# Patient Record
Sex: FEMALE | Race: WHITE | Employment: FULL TIME | URBAN - METROPOLITAN AREA
[De-identification: names, ages, dates, MRNs, and addresses within clinical notes are randomized per-mention and may not be internally consistent; named-entity substitution may affect disease eponyms.]

---

## 2017-11-28 ENCOUNTER — TRANSFERRED RECORDS (OUTPATIENT)
Dept: HEALTH INFORMATION MANAGEMENT | Facility: CLINIC | Age: 27
End: 2017-11-28

## 2017-12-24 ENCOUNTER — HEALTH MAINTENANCE LETTER (OUTPATIENT)
Age: 27
End: 2017-12-24

## 2018-10-10 ENCOUNTER — TRANSFERRED RECORDS (OUTPATIENT)
Dept: HEALTH INFORMATION MANAGEMENT | Facility: CLINIC | Age: 28
End: 2018-10-10

## 2019-01-09 ENCOUNTER — TRANSFERRED RECORDS (OUTPATIENT)
Dept: HEALTH INFORMATION MANAGEMENT | Facility: CLINIC | Age: 29
End: 2019-01-09

## 2019-01-09 ENCOUNTER — MEDICAL CORRESPONDENCE (OUTPATIENT)
Dept: HEALTH INFORMATION MANAGEMENT | Facility: CLINIC | Age: 29
End: 2019-01-09

## 2019-01-24 ENCOUNTER — TRANSFERRED RECORDS (OUTPATIENT)
Dept: HEALTH INFORMATION MANAGEMENT | Facility: CLINIC | Age: 29
End: 2019-01-24

## 2019-01-25 ENCOUNTER — TRANSFERRED RECORDS (OUTPATIENT)
Dept: HEALTH INFORMATION MANAGEMENT | Facility: CLINIC | Age: 29
End: 2019-01-25

## 2019-02-01 ASSESSMENT — ENCOUNTER SYMPTOMS
STIFFNESS: 0
SEIZURES: 0
DISTURBANCES IN COORDINATION: 0
DECREASED CONCENTRATION: 1
JOINT SWELLING: 0
SPEECH CHANGE: 1
DIZZINESS: 0
MYALGIAS: 1
BACK PAIN: 1
WEAKNESS: 0
NECK PAIN: 0
PANIC: 0
DEPRESSION: 1
ARTHRALGIAS: 1
HEADACHES: 1
INSOMNIA: 1
NERVOUS/ANXIOUS: 1
MUSCLE CRAMPS: 1
MUSCLE WEAKNESS: 0
TINGLING: 1
PARALYSIS: 0
MEMORY LOSS: 1
LOSS OF CONSCIOUSNESS: 0
NUMBNESS: 0
TREMORS: 0

## 2019-02-08 ENCOUNTER — OFFICE VISIT (OUTPATIENT)
Dept: NEUROSURGERY | Facility: CLINIC | Age: 29
End: 2019-02-08
Payer: COMMERCIAL

## 2019-02-08 VITALS
HEART RATE: 89 BPM | RESPIRATION RATE: 20 BRPM | BODY MASS INDEX: 39.22 KG/M2 | DIASTOLIC BLOOD PRESSURE: 74 MMHG | TEMPERATURE: 98.1 F | OXYGEN SATURATION: 100 % | WEIGHT: 221.4 LBS | SYSTOLIC BLOOD PRESSURE: 137 MMHG

## 2019-02-08 DIAGNOSIS — Q06.2 DIASTEMATOMYELIA (H): ICD-10-CM

## 2019-02-08 DIAGNOSIS — Q06.8 TETHERED CORD (H): Primary | ICD-10-CM

## 2019-02-08 RX ORDER — ERGOCALCIFEROL (VITAMIN D2) 50 MCG
2000 CAPSULE ORAL AT BEDTIME
COMMUNITY

## 2019-02-08 RX ORDER — HYDROCODONE BITARTRATE AND ACETAMINOPHEN 5; 325 MG/1; MG/1
1 TABLET ORAL PRN
COMMUNITY
Start: 2019-01-09

## 2019-02-08 RX ORDER — DULOXETIN HYDROCHLORIDE 60 MG/1
CAPSULE, DELAYED RELEASE ORAL
COMMUNITY
Start: 2017-03-06

## 2019-02-08 RX ORDER — HYDROXYZINE HYDROCHLORIDE 25 MG/1
12.5-25 TABLET, FILM COATED ORAL PRN
COMMUNITY
Start: 2019-02-05

## 2019-02-08 RX ORDER — LIDOCAINE 50 MG/G
1 PATCH TOPICAL PRN
COMMUNITY
Start: 2017-11-27

## 2019-02-08 ASSESSMENT — PATIENT HEALTH QUESTIONNAIRE - PHQ9
SUM OF ALL RESPONSES TO PHQ QUESTIONS 1-9: 12
10. IF YOU CHECKED OFF ANY PROBLEMS, HOW DIFFICULT HAVE THESE PROBLEMS MADE IT FOR YOU TO DO YOUR WORK, TAKE CARE OF THINGS AT HOME, OR GET ALONG WITH OTHER PEOPLE: SOMEWHAT DIFFICULT
SUM OF ALL RESPONSES TO PHQ QUESTIONS 1-9: 12

## 2019-02-08 ASSESSMENT — PAIN SCALES - GENERAL: PAINLEVEL: SEVERE PAIN (7)

## 2019-02-08 NOTE — LETTER
2/8/2019       RE: Heaven Marx  [address] [facility] [address]     Dear Colleague,    Thank you for referring your patient, Heaven Marx, to the [facility]. Please see a copy of my visit note below.    Service Date: 02/08/2019      It was a pleasure seeing Heaven in clinic today.  She is a 28-year-old patient with a history of split cord malformation who is here in followup with development of symptom recurrence.  She was last seen by Dr. Awad in 08/2015.  In brief summary, she initially presented to him with intermittent left leg weakness and some problems with bowel and bladder control that had been going on for years.  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  She underwent L2-L3 laminectomy and repair of the diastematomyelia with spinal cord untethering in 04/2015 by Dr. Awad with improvement.  She has been doing well; however, for the last 2 years she has had a recurrence of her initial symptoms.  She has developed increased left lower extremity weakness, mostly involving the back and buttocks.  There is also pain radiating from her back down her buttocks and sometimes down her leg.  She continues to have some bowel and bladder problems, she has constipation which sometimes alternates with diarrhea.  She has been followed by Urology and is on a clean intermittent catheterization schedule; however, she forgets to self-catheterization sometimes.  She has been told she has a neurogenic bladder and she has had urodynamic studies in the past supporting this.  She feels her pain is worse with activities.      PHYSICAL EXAMINATION:  On exam, she is well-appearing.  She is fully oriented.  Speech is fluent and clear.  Extraocular movements are full.  Gaze is conjugate.  Strength in the upper extremities is 5/5 and lower extremity strength is also 5/5 bilaterally.  She is able to change positions  from sitting to standing without difficulty.  She has a mildly ataxic gait.  Reflexes are 2+ throughout with the exception of the right lower extremity which is 1+.  Sensation is grossly intact.  Her incision is well-healed.      I reviewed her most recent MRI scan and compared this to prior scans.  Her most recent MRI scan of her spine was done on 2019.  This looks very similar to her prior scans, not surprisingly.  She has a split cord malformation in a single dural sac at the level of L2-3.  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  There is a small disk bulge at L2-3, which is not causing any significant stenosis.  There is no significant foraminal stenosis.      IMPRESSION:  Split cord malformation with recurrent symptoms.      PLAN:  We discussed that there is a high probability that her recurrence of symptoms are related to retethering.  We discussed that it is also possible that there is something else going on.  Of note, she has had SI joint injections which did not help.  In fact, she felt her symptoms were worse following that.  We discussed options which would include further nonoperative therapy at this point which could include physical therapy and medications.  The other option would be to proceed with surgical exploration and, if adhesions are found, adhesiolysis.  She feels she has exhausted her nonoperative therapy options and this has been going on for years.  She is interested in repeat surgery.  We will plan this and see her back soon.  We discussed risks of the procedure which would include cerebrospinal fluid leak, bleeding, failure to improve symptoms and even possible spinal cord injury.  She understands and wishes to proceed.      RICHA GHOTRA MD       D: 02/10/2019   T: 2019   MT: LALY      Name:     KYLIE MENG   MRN:      [id number]        Account:      [id number]   :      1990           Service Date: 2019      Document: [id number]

## 2019-02-09 ASSESSMENT — PATIENT HEALTH QUESTIONNAIRE - PHQ9: SUM OF ALL RESPONSES TO PHQ QUESTIONS 1-9: 12

## 2019-02-11 NOTE — PROGRESS NOTES
Service Date: 02/08/2019      It was a pleasure seeing Heaven in clinic today.  She is a 28-year-old patient with a history of split cord malformation who is here in followup with development of symptom recurrence.  She was last seen by Dr. Awad in 08/2015.  In brief summary, she initially presented to him with intermittent left leg weakness and some problems with bowel and bladder control that had been going on for years.  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  She underwent L2-L3 laminectomy and repair of the diastematomyelia with spinal cord untethering in 04/2015 by Dr. Awad with improvement.  She has been doing well; however, for the last 2 years she has had a recurrence of her initial symptoms.  She has developed increased left lower extremity weakness, mostly involving the back and buttocks.  There is also pain radiating from her back down her buttocks and sometimes down her leg.  She continues to have some bowel and bladder problems, she has constipation which sometimes alternates with diarrhea.  She has been followed by Urology and is on a clean intermittent catheterization schedule; however, she forgets to self-catheterization sometimes.  She has been told she has a neurogenic bladder and she has had urodynamic studies in the past supporting this.  She feels her pain is worse with activities.      PHYSICAL EXAMINATION:  On exam, she is well-appearing.  She is fully oriented.  Speech is fluent and clear.  Extraocular movements are full.  Gaze is conjugate.  Strength in the upper extremities is 5/5 and lower extremity strength is also 5/5 bilaterally.  She is able to change positions from sitting to standing without difficulty.  She has a mildly ataxic gait.  Reflexes are 2+ throughout with the exception of the right lower extremity which is 1+.  Sensation is grossly intact.  Her incision is well-healed.      I reviewed her most recent MRI scan and  compared this to prior scans.  Her most recent MRI scan of her spine was done on 2019.  This looks very similar to her prior scans, not surprisingly.  She has a split cord malformation in a single dural sac at the level of L2-3.  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  There is a small disk bulge at L2-3, which is not causing any significant stenosis.  There is no significant foraminal stenosis.      IMPRESSION:  Split cord malformation with recurrent symptoms.      PLAN:  We discussed that there is a high probability that her recurrence of symptoms are related to retethering.  We discussed that it is also possible that there is something else going on.  Of note, she has had SI joint injections which did not help.  In fact, she felt her symptoms were worse following that.  We discussed options which would include further nonoperative therapy at this point which could include physical therapy and medications.  The other option would be to proceed with surgical exploration and, if adhesions are found, adhesiolysis.  She feels she has exhausted her nonoperative therapy options and this has been going on for years.  She is interested in repeat surgery.  We will plan this and see her back soon.  We discussed risks of the procedure which would include cerebrospinal fluid leak, bleeding, failure to improve symptoms and even possible spinal cord injury.  She understands and wishes to proceed.         RICHA GHOTRA MD             D: 02/10/2019   T: 2019   MT: LALY      Name:     KYLIE MENG   MRN:      [id number]        Account:      [id number]   :      1990           Service Date: 2019      Document: [id number]

## 2019-03-26 ENCOUNTER — MYC MEDICAL ADVICE (OUTPATIENT)
Dept: NEUROSURGERY | Facility: CLINIC | Age: 29
End: 2019-03-26

## 2019-03-26 NOTE — TELEPHONE ENCOUNTER
From: Heaven Marx  To: Salomón Renner MD  Sent: 3/26/2019 2:42 PM CDT  Subject: Question about an upcoming visit    Hi I am just wondering if there is any idea of how long I will be in hospital for my upcoming surgery? My  will have to take a day off [place] so we were just trying to plan so he can use vacation.    Thanks   Heaven

## 2019-04-23 ENCOUNTER — PRE VISIT (OUTPATIENT)
Dept: SURGERY | Facility: CLINIC | Age: 29
End: 2019-04-23

## 2019-04-23 NOTE — TELEPHONE ENCOUNTER
FUTURE VISIT INFORMATION      SURGERY INFORMATION:    Date: 5/3/19    Location: [facility] OR    Surgeon:  Salomón Renner    Anesthesia Type:  General    RECORDS REQUESTED FROM:       [facility]    Most recent EKG+ Tracin14- [facility]    Most recent ECHO: 14- [facility]

## 2019-05-02 ENCOUNTER — ANESTHESIA EVENT (OUTPATIENT)
Dept: SURGERY | Facility: CLINIC | Age: 29
End: 2019-05-02
Payer: COMMERCIAL

## 2019-05-02 ENCOUNTER — OFFICE VISIT (OUTPATIENT)
Dept: SURGERY | Facility: CLINIC | Age: 29
End: 2019-05-02
Payer: COMMERCIAL

## 2019-05-02 VITALS
BODY MASS INDEX: 38.25 KG/M2 | SYSTOLIC BLOOD PRESSURE: 139 MMHG | HEIGHT: 63 IN | RESPIRATION RATE: 16 BRPM | OXYGEN SATURATION: 98 % | DIASTOLIC BLOOD PRESSURE: 85 MMHG | TEMPERATURE: 98 F | HEART RATE: 98 BPM | WEIGHT: 215.9 LBS

## 2019-05-02 DIAGNOSIS — Z01.818 PREOP EXAMINATION: Primary | ICD-10-CM

## 2019-05-02 DIAGNOSIS — Q06.8 TETHERED CORD (H): ICD-10-CM

## 2019-05-02 DIAGNOSIS — Z01.818 PREOP EXAMINATION: ICD-10-CM

## 2019-05-02 LAB
ALBUMIN UR-MCNC: NEGATIVE MG/DL
AMORPH CRY #/AREA URNS HPF: ABNORMAL /HPF
ANION GAP SERPL CALCULATED.3IONS-SCNC: 8 MMOL/L (ref 3–14)
APPEARANCE UR: ABNORMAL
APTT PPP: 27 SEC (ref 22–37)
BACTERIA #/AREA URNS HPF: ABNORMAL /HPF
BILIRUB UR QL STRIP: NEGATIVE
BUN SERPL-MCNC: 8 MG/DL (ref 7–30)
CALCIUM SERPL-MCNC: 9.1 MG/DL (ref 8.5–10.1)
CHLORIDE SERPL-SCNC: 105 MMOL/L (ref 94–109)
CO2 SERPL-SCNC: 28 MMOL/L (ref 20–32)
COLOR UR AUTO: YELLOW
CREAT SERPL-MCNC: 0.71 MG/DL (ref 0.52–1.04)
ERYTHROCYTE [DISTWIDTH] IN BLOOD BY AUTOMATED COUNT: 13.6 % (ref 10–15)
GFR SERPL CREATININE-BSD FRML MDRD: >90 ML/MIN/{1.73_M2}
GLUCOSE SERPL-MCNC: 79 MG/DL (ref 70–99)
GLUCOSE UR STRIP-MCNC: NEGATIVE MG/DL
HCT VFR BLD AUTO: 37.2 % (ref 35–47)
HGB BLD-MCNC: 12.5 G/DL (ref 11.7–15.7)
HGB UR QL STRIP: ABNORMAL
INR PPP: 1.05 (ref 0.86–1.14)
KETONES UR STRIP-MCNC: NEGATIVE MG/DL
LEUKOCYTE ESTERASE UR QL STRIP: NEGATIVE
MCH RBC QN AUTO: 30.6 PG (ref 26.5–33)
MCHC RBC AUTO-ENTMCNC: 33.6 G/DL (ref 31.5–36.5)
MCV RBC AUTO: 91 FL (ref 78–100)
MUCOUS THREADS #/AREA URNS LPF: PRESENT /LPF
NITRATE UR QL: NEGATIVE
PH UR STRIP: 6 PH (ref 5–7)
PLATELET # BLD AUTO: 274 10E9/L (ref 150–450)
POTASSIUM SERPL-SCNC: 3.3 MMOL/L (ref 3.4–5.3)
RBC # BLD AUTO: 4.09 10E12/L (ref 3.8–5.2)
RBC #/AREA URNS AUTO: 5 /HPF (ref 0–2)
SODIUM SERPL-SCNC: 140 MMOL/L (ref 133–144)
SOURCE: ABNORMAL
SP GR UR STRIP: 1.02 (ref 1–1.03)
SQUAMOUS #/AREA URNS AUTO: 14 /HPF (ref 0–1)
UROBILINOGEN UR STRIP-MCNC: 2 MG/DL (ref 0–2)
WBC # BLD AUTO: 8.2 10E9/L (ref 4–11)
WBC #/AREA URNS AUTO: 5 /HPF (ref 0–5)

## 2019-05-02 RX ORDER — ACETAMINOPHEN 500 MG
1000 TABLET ORAL PRN
Status: ON HOLD | COMMUNITY
End: 2019-05-06

## 2019-05-02 ASSESSMENT — LIFESTYLE VARIABLES: TOBACCO_USE: 0

## 2019-05-02 ASSESSMENT — MIFFLIN-ST. JEOR: SCORE: 1673.45

## 2019-05-02 NOTE — PATIENT INSTRUCTIONS
Preparing for Your Surgery      Name:  Heaven Marx   MRN:  [id number]   :  1990   Today's Date:  2019     Arriving for surgery:  Surgery date:  5/3/19  Arrival time:  5:45AM  Please come to:       [facility] [address]  [address] [place]  [address]    - ? parking is available in front of the hospital      -    Please proceed to [address] on the 3rd floor. [phone]?     - ?If you are in need of directions, wheelchair or escort please stop at the Information Desk in the lobby.  Inform the information person that you are here for surgery; a wheelchair and escort to [address] will be provided.?     What can I eat or drink?  -  You may have solid food or milk products until 8 hours prior to your surgery. 19, 11:45PM  -  You may have water, apple juice or 7up/Sprite until 2 hours prior to your surgery. 5/3/19, 5:45AM    Which medicines can I take?  Stop Aspirin, vitamins and supplements one week prior to surgery.  Hold Ibuprofen for 24 hours and/or Naproxen for 48 hours prior to surgery.   -  Do NOT take these medications in the morning, the day of surgery:    Vitamin D    -  Please take these medications the day of surgery:    Bupropion(Wellbutrin)  Duloxetine(Cymbalta)      -As needed:   Hydrocodone-Acetaminophen(Norco)   Hydroxyzine(Atarax)   Lidocaine(Lidoderm) patch    How do I prepare myself?  -  Take two showers: one the night before surgery; and one the morning of surgery.         Use Scrubcare or Hibiclens to wash from neck down.  You may use your own shampoo and conditioner. No other hair products.   -  Do NOT use lotion, powder, deodorant, or antiperspirant the day of your surgery.  -  Do NOT wear any makeup, fingernail polish or jewelry.  - Do not bring your own medications to the hospital, except for inhalers and eye drops.  -  Bring your ID and insurance card.    Questions or Concerns:  -If you have questions or concerns regarding the day  of surgery, please call [phone].     -For questions after surgery please call your surgeons office.           AFTER YOUR SURGERY  Breathing exercises   Breathing exercises help you recover faster. Take deep breaths and let the air out slowly. This will:     Help you wake up after surgery.    Help prevent complications like pneumonia.  Preventing complications will help you go home sooner.   We may give you a breathing device (incentive spirometer) to encourage you to breathe deeply.   Nausea and vomiting   You may feel sick to your stomach after surgery; if so, let your nurse know.    Pain control:  After surgery, you may have pain. Our goal is to help you manage your pain. Pain medicine will help you feel comfortable enough to do activities that will help you heal.  These activities may include breathing exercises, walking and physical therapy.   To help your health care team treat your pain we will ask: 1) If you have pain  2) where it is located 3) describe your pain in your words  Methods of pain control include medications given by mouth, vein or by nerve block for some surgeries.  Sequential Compression Device (SCD) or Pneumo Boots:  You may need to wear SCD S on your legs or feet. These are wraps connected to a machine that pumps in air and releases it. The repeated pumping helps prevent blood clots from forming.

## 2019-05-02 NOTE — ANESTHESIA PREPROCEDURE EVALUATION
Anesthesia Pre-Procedure Evaluation    Patient: Heaven Marx   MRN:     [id number] Gender:   female   Age:    28 year old :      1990        Preoperative Diagnosis: Tethered Cord   Procedure(s):  Spinal Cord Untethering, Adhesion Lysis, Filum Lysis     Past Medical History:   Diagnosis Date     Diastematomyelia (H)      Injury to nerves     In her foot, she dropped air conditioner on it     Other depressive disorder     No Comments Provided      Past Surgical History:   Procedure Laterality Date     GYN SURGERY      hysterectomy BSO     RELEASE TETHERED CORD N/A 2015    Procedure: RELEASE TETHERED CORD;  Surgeon: Nabil Awad MD;  Location: [facility] OR          Anesthesia Evaluation     . Pt has had prior anesthetic. Type: General    No history of anesthetic complications          ROS/MED HX    ENT/Pulmonary: Comment: History of TMJ prior to getting dentures    (+)ENE risk factors snores loudly, obese, , . .   (-) tobacco use   Neurologic:     (+)other neuro Tethered cord, RLS, LLE radiculopathy    Cardiovascular:  - neg cardiovascular ROS   (+) ----. : . . . :. . Previous cardiac testing Echodate:results:Interpretation Summary  Technically difficult study.  The left ventricular systolic function is normal.  Doppler findings do not suggest pulmonary hypertension.  No significant valvular disease present.  Compared to prior echo report of 2012, there is no significant change.    Left Ventricle  The left ventricular systolic function is normal. The left ventricle is normal size.  There is normal left ventricular wall thickness. Wall motion is normal.date: results:ECG reviewed date: results:Sinus bradycardia  T wave abnormality, consider anterolateral ischemia  Prolonged QT  Abnormal ECG  When compared with ECG of 10-MAY-2014 23:59,  Vent. rate has decreased BY  46 BPM  Nonspecific T wave abnormality no longer evident in Inferior leads  T wave inversion now evident in Lateral leads date:  "results:          METS/Exercise Tolerance:  >4 METS   Hematologic:  - neg hematologic  ROS       Musculoskeletal:   (+)  other musculoskeletal- left low back pain      GI/Hepatic:     (+) GERD Other,       Renal/Genitourinary:     (+) Other Renal/ Genitourinary, neurogenic bladder - intermittent self cath      Endo:     (+) Obesity, .      Psychiatric:     (+) psychiatric history anxiety and depression      Infectious Disease:  - neg infectious disease ROS       Malignancy:      - no malignancy   Other:    (+) No chance of pregnancy H/O Chronic Pain,H/O chronic opiod use ,   - neg other ROS                     PHYSICAL EXAM:   Mental Status/Neuro: A/A/O   Airway: Facies: Feasible  Mallampati: I  Mouth/Opening: Full  TM distance: > 6 cm  Neck ROM: Full   Respiratory: Auscultation: CTAB     Resp. Rate: Normal     Resp. Effort: Normal      CV: Rhythm: Regular  Rate: Age appropriate  Heart: Normal Sounds   Comments:      Dental:  Dentures: Lower; Upper     Habitus: Obesity             Lab Results   Component Value Date    WBC 7.9 04/10/2015    HGB 10.6 (L) 04/10/2015    HCT 32.5 (L) 04/10/2015     04/10/2015     04/10/2015    POTASSIUM 3.7 04/10/2015    CHLORIDE 110 (H) 04/10/2015    CO2 24 04/10/2015    BUN 7 04/10/2015    CR 0.73 04/10/2015    GLC 84 04/10/2015    JENNIFER 8.0 (L) 04/10/2015    PTT 28 04/08/2015    INR 1.02 04/08/2015    HCG Negative 02/17/2013       Preop Vitals  BP Readings from Last 3 Encounters:   02/08/19 137/74   08/12/15 112/69   05/26/15 117/68    Pulse Readings from Last 3 Encounters:   02/08/19 89   08/12/15 80   05/26/15 84      Resp Readings from Last 3 Encounters:   02/08/19 20   04/13/15 14   04/08/15 16    SpO2 Readings from Last 3 Encounters:   02/08/19 100%   04/13/15 93%   04/08/15 100%      Temp Readings from Last 1 Encounters:   02/08/19 36.7  C (98.1  F) (Oral)    Ht Readings from Last 1 Encounters:   08/12/15 1.6 m (5' 3\")      Wt Readings from Last 1 Encounters: " "  02/08/19 100.4 kg (221 lb 6.4 oz)    Estimated body mass index is 39.22 kg/m  as calculated from the following:    Height as of 8/12/15: 1.6 m (5' 3\").    Weight as of 2/8/19: 100.4 kg (221 lb 6.4 oz).     LDA:  Peripheral IV 04/09/15 Left Lower forearm (Active)   Number of days: 1484       Peripheral IV 04/12/15 Left;Anterior;Proximal Upper forearm (Active)   Number of days: 1481            Assessment:   ASA SCORE: 2       Documentation: H&P complete; Preop Testing complete; Consents complete   Proceeding: Proceed without further delay  Tobacco Use:  NO Active use of Tobacco/UNKNOWN Tobacco use status     Plan:   Anes. Type:  General   Pre-Induction: Midazolam IV; Acetaminophen PO   Induction:  IV (Standard)   Airway: Oral ETT   Access/Monitoring: PIV; 2nd PIV   Maintenance: Balanced   Emergence: Procedure Site   Logistics: Observation/Admission     Postop Pain/Sedation Strategy:  Standard-Options: Opioids PRN     PONV Management:  Adult Risk Factors: Female, Non-Smoker, Postop Opioids  Prevention: Ondansetron; Dexamethasone                  PAC Discussion and Assessment    ASA Classification: 2  Case is suitable for: [place]  Anesthetic techniques and relevant risks discussed: GA  Invasive monitoring and risk discussed:   Types:   Possibility and Risk of blood transfusion discussed:   NPO instructions given:   Additional anesthetic preparation and risks discussed:   Needs early admission to pre-op area:   Other:     PAC Resident/NP Anesthesia Assessment:  Heaven Marx is a 28 year old female scheduled for a Spinal Cord Untethering, Adhesion Lysis, Filum Lysis on 5/2/2019 by Dr. Renner in treatment of a tethered cord.  PAC referral for risk assessment and optimization for anesthesia with comorbid conditions of: obesity, RLS, GERD, neurogenic bladder, LLE radiculopathy, depression and anxiety.      Pre-operative considerations:  1.  Cardiac:  Functional status- METS >4.  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  She has no known cardiac conditions.  Intermediate risk surgery with 0.4% risk of major adverse cardiac event.   2.  Pulm:  Airway feasible.  ENE risk: low.    3.  GI:  Risk of PONV score = 3.  If > 2, anti-emetic intervention recommended.  She has rare GERD symptoms when she has an empty stomach.  She uses no GERD medications.  Encouraged to notify anesthesia tomorrow if noticing any GERD symptoms in pre-op due to her NPO status.  4. : She self catheterizes intermittently for neurogenic bladder.      VTE risk:  0.26%    Patient is optimized and is acceptable candidate for the proposed procedure.  No further diagnostic evaluation is needed.     **For further details of assessment, testing, and physical exam please see H and P completed on same date.          Nichole Khan DNP, RN, APRN      Reviewed and Signed by PAC Mid-Level Provider/Resident  Mid-Level Provider/Resident: Nichole Khan DNP, RN, APRN  Date: 5/2/2019  Time: 1604    Attending Anesthesiologist Anesthesia Assessment:        Anesthesiologist:   Date:   Time:   Pass/Fail:   Disposition:     PAC Pharmacist Assessment:        Pharmacist:   Date:   Time:        Everette Conte DO

## 2019-05-02 NOTE — RESULT ENCOUNTER NOTE
Vinny Collado,    Your test results are attached.  All of your labs are good for surgery tomorrow.  As we discussed over the phone, I would recommend a repeat urine test with your primary care provider at some point after surgery.          Nichole Khan DNP, RN, ANP-C

## 2019-05-02 NOTE — H&P
Pre-Operative H & P     CC:  Preoperative exam to assess for increased cardiopulmonary risk while undergoing surgery and anesthesia.    Date of Encounter: 5/2/2019  Primary Care Physician:  Lorrie Olivas  Associated diagnosis: tethered cord    HPI  Heaven Marx is a 28 year old female who presents for pre-operative H & P in preparation for a Spinal Cord Untethering, Adhesion Lysis, Filum Lysis on 5/2/2019 by Dr. Renner at [facility].     Heaven Marx is a 28 year old female with obesity, RLS, GERD, neurogenic bladder, LLE radiculopathy, depression and anxiety that has a tethered spinal cord.  She underwent a tethered cord release on 4/9/2015 with Dr. Awad.  She reports that her symptoms resolved after that for about 2 years, but then the symptoms returned.  Her symptoms are primarily left low back pain and LLE radiculopathy.  She also has neurogenic bladder.  She consulted earlier this past winter with Dr. George and now the above listed surgery has been recommended for treatment.       History is obtained from the patient and the medical record.     Past Medical History  Past Medical History:   Diagnosis Date     Anxiety      Depression      Diastematomyelia (H)      Injury to nerves     In her foot, she dropped air conditioner on it     Neurogenic bladder      Obesity      Other depressive disorder     No Comments Provided     RLS (restless legs syndrome)      Tethered cord (H)        Past Surgical History  Past Surgical History:   Procedure Laterality Date     GYN SURGERY  2014    hysterectomy BSO     RELEASE TETHERED CORD N/A 4/9/2015    Procedure: RELEASE TETHERED CORD;  Surgeon: Nabil Awad MD;  Location: [facility] OR       Hx of Blood transfusions/reactions: none    Hx of abnormal bleeding or anti-platelet use: none    Menstrual history: No LMP recorded. Patient has had a hysterectomy.:     Steroid use in the last year: none    Personal or FH with  difficulty with Anesthesia:  none    Prior to Admission Medications  Current Outpatient Medications   Medication Sig Dispense Refill     acetaminophen (TYLENOL) 500 MG tablet Take 1,000 mg by mouth as needed for mild pain       buPROPion (WELLBUTRIN XL) 300 MG 24 hr tablet Take 150 mg by mouth At Bedtime        DULoxetine (CYMBALTA) 60 MG capsule TAKE 1 CAPSULE DAILY AT BEDTIME DO NOT CRUSH       HYDROcodone-acetaminophen (NORCO) 5-325 MG tablet Take 1 tablet by mouth as needed for pain        hydrOXYzine (ATARAX) 25 MG tablet Take 12.5-25 mg by mouth as needed        lidocaine (LIDODERM) 5 % patch Place 1 patch onto the skin as needed        Vitamin D2, Ergocalciferol, 2000 units CAPS Take 2,000 Units by mouth At Bedtime ON HOLD FOR SURGERY SINCE 04/18/2019         Allergies  Allergies   Allergen Reactions     Gabapentin Hives     Sulfa Drugs Hives       Social History  Social History     Socioeconomic History     Marital status:      Spouse name: Not on file     Number of children: 0     Years of education: Not on file     Highest education level: Not on file   Occupational History     Occupation: CNA     Employer: OTHER   Social Needs     Financial resource strain: Not on file     Food insecurity:     Worry: Not on file     Inability: Not on file     Transportation needs:     Medical: Not on file     Non-medical: Not on file   Tobacco Use     Smoking status: Never Smoker     Smokeless tobacco: Never Used   Substance and Sexual Activity     Alcohol use: No     Comment: rare     Drug use: No     Types: Other     Comment: Drug use: No     Sexual activity: Never   Lifestyle     Physical activity:     Days per week: Not on file     Minutes per session: Not on file     Stress: Not on file   Relationships     Social connections:     Talks on phone: Not on file     Gets together: Not on file     Attends Jew service: Not on file     Active member of club or organization: Not on file     Attends meetings of  clubs or organizations: Not on file     Relationship status: Not on file     Intimate partner violence:     Fear of current or ex partner: Not on file     Emotionally abused: Not on file     Physically abused: Not on file     Forced sexual activity: Not on file   Other Topics Concern     Parent/sibling w/ CABG, MI or angioplasty before 65F 55M? Not Asked   Social History Narrative    ** Merged History Encounter **         Preloaded.  1/3/2013       Family History  Family History   Problem Relation Age of Onset     Hypertension Mother      No Known Problems Father      Aneurysm Maternal Grandmother         brain     Kidney Disease Maternal Grandfather      Diabetes Maternal Grandfather      Lung Cancer Paternal Grandmother      Liver Cancer Paternal Grandfather      Neurologic Disorder No family hx of                ROS/MED HX    The complete review of systems is negative other than noted in the HPI or here.   ENT/Pulmonary: Comment: History of TMJ prior to getting dentures    (+)ENE risk factors snores loudly, obese, , . .   (-) tobacco use   Neurologic:     (+)other neuro Tethered cord, RLS, LLE radiculopathy    Cardiovascular:  - neg cardiovascular ROS   (+) ----. : . . . :. . No previous cardiac testing       METS/Exercise Tolerance:  >4 METS   Hematologic:  - neg hematologic  ROS       Musculoskeletal:   (+)  other musculoskeletal- left low back pain      GI/Hepatic:     (+) GERD Other,       Renal/Genitourinary:     (+) Other Renal/ Genitourinary, neurogenic bladder - intermittent self cath      Endo:     (+) Obesity, .      Psychiatric:     (+) psychiatric history anxiety and depression      Infectious Disease:  - neg infectious disease ROS       Malignancy:      - no malignancy   Other:    (+) No chance of pregnancy H/O Chronic Pain,H/O chronic opiod use ,   - neg other ROS                     PHYSICAL EXAM:   Mental Status/Neuro: A/A/O   Airway: Facies: Feasible  Mallampati: I  Mouth/Opening: Full  TM  "distance: > 6 cm  Neck ROM: Full   Respiratory: Auscultation: CTAB     Resp. Rate: Normal     Resp. Effort: Normal      CV: Rhythm: Regular  Rate: Age appropriate  Heart: Normal Sounds   Comments:      Dental:  Dentures: Lower; Upper     Habitus: Obesity             Temp: 98  F (36.7  C) Temp src: Oral BP: 139/85 Pulse: 98   Resp: 16 SpO2: 98 %         215 lbs 14.4 oz  5' 3\"   Body mass index is 38.24 kg/m .       Physical Exam  Constitutional: Awake, alert, cooperative, no apparent distress, and appears stated age. obese  Eyes: Pupils equal, round and reactive to light, extra ocular muscles intact, sclera clear, conjunctiva normal.  HENT: Normocephalic, oral pharynx with moist mucus membranes.  Dentition - upper and lower dentures.  No goiter appreciated.   Respiratory: Clear to auscultation bilaterally, no crackles or wheezing.  Cardiovascular: Regular rate and rhythm, normal S1 and S2, and no murmur noted.  Carotids +2, no bruits. No edema. Palpable pulses to radial  DP and PT arteries.   GI: Normal bowel sounds, soft, non-distended, non-tender, no masses palpated, no hepatosplenomegaly.    Lymph/Hematologic: No cervical lymphadenopathy and no supraclavicular lymphadenopathy.  Genitourinary:  deferred  Skin: Warm and dry.  No rashes at anticipated surgical site.   Musculoskeletal: Full ROM of neck. There is no redness, warmth, or swelling of the exposed joints. Gross motor strength is normal.    Neurologic: Awake, alert, oriented to name, place and time. Cranial nerves II-XII are grossly intact. Gait is normal.   Neuropsychiatric: Calm, cooperative. Normal affect.     Labs: (personally reviewed)  Component      Latest Ref Rng & Units 5/2/2019   Color Urine       Yellow   Appearance Urine       Slightly Cloudy   Glucose Urine      NEG:Negative mg/dL Negative   Bilirubin Urine      NEG:Negative Negative   Ketones Urine      NEG:Negative mg/dL Negative   Specific Gravity Urine      1.003 - 1.035 1.019   Blood " Urine      NEG:Negative Moderate (A)   pH Urine      5.0 - 7.0 pH 6.0   Protein Albumin Urine      NEG:Negative mg/dL Negative   Urobilinogen mg/dL      0.0 - 2.0 mg/dL 2.0   Nitrite Urine      NEG:Negative Negative   Leukocyte Esterase Urine      NEG:Negative Negative   Source       Midstream Urine   RBC Urine      0 - 2 /HPF 5 (H)   WBC Urine      0 - 5 /HPF 5   Bacteria Urine      NEG:Negative /HPF Few (A)   Squamous Epithelial /HPF Urine      0 - 1 /HPF 14 (H)   Mucous Urine      NEG:Negative /LPF Present (A)   Amorphous Crystals      NEG:Negative /HPF Many (A)   Sodium      133 - 144 mmol/L 140   Potassium      3.4 - 5.3 mmol/L 3.3 (L)   Chloride      94 - 109 mmol/L 105   Carbon Dioxide      20 - 32 mmol/L 28   Anion Gap      3 - 14 mmol/L 8   Glucose      70 - 99 mg/dL 79   Urea Nitrogen      7 - 30 mg/dL 8   Creatinine      0.52 - 1.04 mg/dL 0.71   GFR Estimate      >60 mL/min/1.73:m2 >90   GFR Estimate If Black      >60 mL/min/1.73:m2 >90   Calcium      8.5 - 10.1 mg/dL 9.1   WBC      4.0 - 11.0 10e9/L 8.2   RBC Count      3.8 - 5.2 10e12/L 4.09   Hemoglobin      11.7 - 15.7 g/dL 12.5   Hematocrit      35.0 - 47.0 % 37.2   MCV      78 - 100 fl 91   MCH      26.5 - 33.0 pg 30.6   MCHC      31.5 - 36.5 g/dL 33.6   RDW      10.0 - 15.0 % 13.6   Platelet Count      150 - 450 10e9/L 274   INR      0.86 - 1.14 1.05   PTT      22 - 37 sec 27             Echocardiogram  2014  Interpretation Summary  Technically difficult study.  The left ventricular systolic function is normal.  Doppler findings do not suggest pulmonary hypertension.  No significant valvular disease present.  Compared to prior echo report of 8/16/2012, there is no significant change.    Left Ventricle  The left ventricular systolic function is normal. The left ventricle is normal size.  There is normal left ventricular wall thickness. Wall motion is normal.    EKG  2014  Sinus bradycardia  T wave abnormality, consider anterolateral  ischemia  Prolonged QT  Abnormal ECG  When compared with ECG of 10-MAY-2014 23:59,  Vent. rate has decreased BY  46 BPM  Nonspecific T wave abnormality no longer evident in Inferior leads  T wave inversion now evident in Lateral leads        Outside records reviewed from: Care Everywhere    ASSESSMENT and PLAN  Heaven Marx is a 28 year old female scheduled for a Spinal Cord Untethering, Adhesion Lysis, Filum Lysis on 5/2/2019 by Dr. Renner in treatment of a tethered cord.  PAC referral for risk assessment and optimization for anesthesia with comorbid conditions of: obesity, RLS, GERD, neurogenic bladder, LLE radiculopathy, depression and anxiety.      Pre-operative considerations:  1.  Cardiac:  Functional status- METS >4.  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  She has no known cardiac conditions.  Intermediate risk surgery with 0.4% risk of major adverse cardiac event.   2.  Pulm:  Airway feasible.  ENE risk: low.    3.  GI:  Risk of PONV score = 3.  If > 2, anti-emetic intervention recommended.  She has rare GERD symptoms when she has an empty stomach.  She uses no GERD medications.  Encouraged to notify anesthesia tomorrow if noticing any GERD symptoms in pre-op due to her NPO status.  4. : She self catheterizes intermittently for neurogenic bladder.      VTE risk:  0.26%    Patient is optimized and is acceptable candidate for the proposed procedure.  No further diagnostic evaluation is needed.             Nichole Khan DNP, RN, APRN  Preoperative Assessment Center  [facility]  Phone: [phone]  Fax: [phone]

## 2019-05-03 ENCOUNTER — ANESTHESIA (OUTPATIENT)
Dept: SURGERY | Facility: CLINIC | Age: 29
End: 2019-05-03
Payer: COMMERCIAL

## 2019-05-03 ENCOUNTER — HOSPITAL ENCOUNTER (INPATIENT)
Facility: CLINIC | Age: 29
LOS: 3 days | Discharge: HOME | End: 2019-05-06
Attending: NEUROLOGICAL SURGERY | Admitting: NEUROLOGICAL SURGERY
Payer: COMMERCIAL

## 2019-05-03 ENCOUNTER — APPOINTMENT (OUTPATIENT)
Dept: GENERAL RADIOLOGY | Facility: CLINIC | Age: 29
End: 2019-05-03
Attending: ANESTHESIOLOGY
Payer: COMMERCIAL

## 2019-05-03 ENCOUNTER — APPOINTMENT (OUTPATIENT)
Dept: GENERAL RADIOLOGY | Facility: CLINIC | Age: 29
End: 2019-05-03
Attending: NEUROLOGICAL SURGERY
Payer: COMMERCIAL

## 2019-05-03 DIAGNOSIS — Z98.890 S/P SPINAL SURGERY: Primary | ICD-10-CM

## 2019-05-03 LAB
ABO + RH BLD: NORMAL
ABO + RH BLD: NORMAL
BLD GP AB SCN SERPL QL: NORMAL
BLOOD BANK CMNT PATIENT-IMP: NORMAL
GLUCOSE BLDC GLUCOMTR-MCNC: 87 MG/DL (ref 70–99)
SPECIMEN EXP DATE BLD: NORMAL

## 2019-05-03 PROCEDURE — 95940 IONM IN OPERATNG ROOM 15 MIN: CPT | Performed by: NEUROLOGICAL SURGERY

## 2019-05-03 PROCEDURE — 25800030 ZZH RX IP 258 OP 636: Performed by: STUDENT IN AN ORGANIZED HEALTH CARE EDUCATION/TRAINING PROGRAM

## 2019-05-03 PROCEDURE — 40000170 ZZH STATISTIC PRE-PROCEDURE ASSESSMENT II: Performed by: NEUROLOGICAL SURGERY

## 2019-05-03 PROCEDURE — 40000986 XR CHEST PORT 1 VW

## 2019-05-03 PROCEDURE — 37000009 ZZH ANESTHESIA TECHNICAL FEE, EACH ADDTL 15 MIN: Performed by: NEUROLOGICAL SURGERY

## 2019-05-03 PROCEDURE — 12000001 ZZH R&B MED SURG/OB UMMC

## 2019-05-03 PROCEDURE — 00NY0ZZ RELEASE LUMBAR SPINAL CORD, OPEN APPROACH: ICD-10-PCS | Performed by: NEUROLOGICAL SURGERY

## 2019-05-03 PROCEDURE — 25000128 H RX IP 250 OP 636: Performed by: STUDENT IN AN ORGANIZED HEALTH CARE EDUCATION/TRAINING PROGRAM

## 2019-05-03 PROCEDURE — 71000014 ZZH RECOVERY PHASE 1 LEVEL 2 FIRST HR: Performed by: NEUROLOGICAL SURGERY

## 2019-05-03 PROCEDURE — 00000146 ZZHCL STATISTIC GLUCOSE BY METER IP

## 2019-05-03 PROCEDURE — 36000070 ZZH SURGERY LEVEL 5 EA 15 ADDTL MIN - UMMC: Performed by: NEUROLOGICAL SURGERY

## 2019-05-03 PROCEDURE — 25000128 H RX IP 250 OP 636: Performed by: NEUROLOGICAL SURGERY

## 2019-05-03 PROCEDURE — 25000125 ZZHC RX 250: Performed by: STUDENT IN AN ORGANIZED HEALTH CARE EDUCATION/TRAINING PROGRAM

## 2019-05-03 PROCEDURE — 40000277 XR SURGERY CARM FLUORO LESS THAN 5 MIN W STILLS: Mod: TC

## 2019-05-03 PROCEDURE — 27210794 ZZH OR GENERAL SUPPLY STERILE: Performed by: NEUROLOGICAL SURGERY

## 2019-05-03 PROCEDURE — 27210995 ZZH RX 272: Performed by: NEUROLOGICAL SURGERY

## 2019-05-03 PROCEDURE — 25000566 ZZH SEVOFLURANE, EA 15 MIN: Performed by: NEUROLOGICAL SURGERY

## 2019-05-03 PROCEDURE — 25000132 ZZH RX MED GY IP 250 OP 250 PS 637: Performed by: STUDENT IN AN ORGANIZED HEALTH CARE EDUCATION/TRAINING PROGRAM

## 2019-05-03 PROCEDURE — 8E0WXBF COMPUTER ASSISTED PROCEDURE OF TRUNK REGION, WITH FLUOROSCOPY: ICD-10-PCS | Performed by: NEUROLOGICAL SURGERY

## 2019-05-03 PROCEDURE — 37000008 ZZH ANESTHESIA TECHNICAL FEE, 1ST 30 MIN: Performed by: NEUROLOGICAL SURGERY

## 2019-05-03 PROCEDURE — 25000128 H RX IP 250 OP 636: Performed by: NURSE ANESTHETIST, CERTIFIED REGISTERED

## 2019-05-03 PROCEDURE — 71000015 ZZH RECOVERY PHASE 1 LEVEL 2 EA ADDTL HR: Performed by: NEUROLOGICAL SURGERY

## 2019-05-03 PROCEDURE — 25000125 ZZHC RX 250: Performed by: NEUROLOGICAL SURGERY

## 2019-05-03 PROCEDURE — 27211022 ZZHC OR IOM SUPPLIES OPNP: Performed by: NEUROLOGICAL SURGERY

## 2019-05-03 PROCEDURE — 36000068 ZZH SURGERY LEVEL 5 1ST 30 MIN - UMMC: Performed by: NEUROLOGICAL SURGERY

## 2019-05-03 RX ORDER — POLYETHYLENE GLYCOL 3350 17 G/17G
17 POWDER, FOR SOLUTION ORAL 3 TIMES DAILY
Status: DISCONTINUED | OUTPATIENT
Start: 2019-05-03 | End: 2019-05-06 | Stop reason: HOSPADM

## 2019-05-03 RX ORDER — LABETALOL 20 MG/4 ML (5 MG/ML) INTRAVENOUS SYRINGE
10
Status: DISCONTINUED | OUTPATIENT
Start: 2019-05-03 | End: 2019-05-03 | Stop reason: HOSPADM

## 2019-05-03 RX ORDER — SODIUM CHLORIDE, SODIUM GLUCONATE, SODIUM ACETATE, POTASSIUM CHLORIDE AND MAGNESIUM CHLORIDE 526; 502; 368; 37; 30 MG/100ML; MG/100ML; MG/100ML; MG/100ML; MG/100ML
INJECTION, SOLUTION INTRAVENOUS CONTINUOUS PRN
Status: DISCONTINUED | OUTPATIENT
Start: 2019-05-03 | End: 2019-05-03

## 2019-05-03 RX ORDER — LIDOCAINE HYDROCHLORIDE 20 MG/ML
INJECTION, SOLUTION INFILTRATION; PERINEURAL PRN
Status: DISCONTINUED | OUTPATIENT
Start: 2019-05-03 | End: 2019-05-03

## 2019-05-03 RX ORDER — BUPROPION HYDROCHLORIDE 150 MG/1
150 TABLET ORAL AT BEDTIME
Status: DISCONTINUED | OUTPATIENT
Start: 2019-05-03 | End: 2019-05-06 | Stop reason: HOSPADM

## 2019-05-03 RX ORDER — NALOXONE HYDROCHLORIDE 0.4 MG/ML
.1-.4 INJECTION, SOLUTION INTRAMUSCULAR; INTRAVENOUS; SUBCUTANEOUS
Status: DISCONTINUED | OUTPATIENT
Start: 2019-05-03 | End: 2019-05-06 | Stop reason: HOSPADM

## 2019-05-03 RX ORDER — DEXAMETHASONE SODIUM PHOSPHATE 4 MG/ML
INJECTION, SOLUTION INTRA-ARTICULAR; INTRALESIONAL; INTRAMUSCULAR; INTRAVENOUS; SOFT TISSUE PRN
Status: DISCONTINUED | OUTPATIENT
Start: 2019-05-03 | End: 2019-05-03

## 2019-05-03 RX ORDER — KETAMINE HYDROCHLORIDE 10 MG/ML
INJECTION, SOLUTION INTRAMUSCULAR; INTRAVENOUS PRN
Status: DISCONTINUED | OUTPATIENT
Start: 2019-05-03 | End: 2019-05-03

## 2019-05-03 RX ORDER — CEFAZOLIN SODIUM 1 G/3ML
1 INJECTION, POWDER, FOR SOLUTION INTRAMUSCULAR; INTRAVENOUS SEE ADMIN INSTRUCTIONS
Status: DISCONTINUED | OUTPATIENT
Start: 2019-05-03 | End: 2019-05-03 | Stop reason: HOSPADM

## 2019-05-03 RX ORDER — FENTANYL CITRATE 50 UG/ML
25-50 INJECTION, SOLUTION INTRAMUSCULAR; INTRAVENOUS
Status: DISCONTINUED | OUTPATIENT
Start: 2019-05-03 | End: 2019-05-03 | Stop reason: HOSPADM

## 2019-05-03 RX ORDER — ACETAMINOPHEN 325 MG/1
650 TABLET ORAL EVERY 4 HOURS PRN
Status: DISCONTINUED | OUTPATIENT
Start: 2019-05-03 | End: 2019-05-06 | Stop reason: HOSPADM

## 2019-05-03 RX ORDER — SODIUM CHLORIDE, SODIUM LACTATE, POTASSIUM CHLORIDE, CALCIUM CHLORIDE 600; 310; 30; 20 MG/100ML; MG/100ML; MG/100ML; MG/100ML
INJECTION, SOLUTION INTRAVENOUS CONTINUOUS
Status: DISCONTINUED | OUTPATIENT
Start: 2019-05-03 | End: 2019-05-03 | Stop reason: HOSPADM

## 2019-05-03 RX ORDER — NALOXONE HYDROCHLORIDE 0.4 MG/ML
.1-.4 INJECTION, SOLUTION INTRAMUSCULAR; INTRAVENOUS; SUBCUTANEOUS
Status: DISCONTINUED | OUTPATIENT
Start: 2019-05-03 | End: 2019-05-03

## 2019-05-03 RX ORDER — OXYCODONE HYDROCHLORIDE 5 MG/1
5-10 TABLET ORAL
Status: DISCONTINUED | OUTPATIENT
Start: 2019-05-03 | End: 2019-05-06 | Stop reason: HOSPADM

## 2019-05-03 RX ORDER — ONDANSETRON 4 MG/1
4-8 TABLET, ORALLY DISINTEGRATING ORAL EVERY 6 HOURS PRN
Status: DISCONTINUED | OUTPATIENT
Start: 2019-05-03 | End: 2019-05-06 | Stop reason: HOSPADM

## 2019-05-03 RX ORDER — HYDROMORPHONE HYDROCHLORIDE 1 MG/ML
.3-.5 INJECTION, SOLUTION INTRAMUSCULAR; INTRAVENOUS; SUBCUTANEOUS EVERY 5 MIN PRN
Status: DISCONTINUED | OUTPATIENT
Start: 2019-05-03 | End: 2019-05-03 | Stop reason: HOSPADM

## 2019-05-03 RX ORDER — ONDANSETRON 2 MG/ML
INJECTION INTRAMUSCULAR; INTRAVENOUS PRN
Status: DISCONTINUED | OUTPATIENT
Start: 2019-05-03 | End: 2019-05-03

## 2019-05-03 RX ORDER — AMOXICILLIN 250 MG
2 CAPSULE ORAL 2 TIMES DAILY
Status: DISCONTINUED | OUTPATIENT
Start: 2019-05-03 | End: 2019-05-06 | Stop reason: HOSPADM

## 2019-05-03 RX ORDER — LABETALOL 20 MG/4 ML (5 MG/ML) INTRAVENOUS SYRINGE
10-40 EVERY 10 MIN PRN
Status: DISCONTINUED | OUTPATIENT
Start: 2019-05-03 | End: 2019-05-06 | Stop reason: HOSPADM

## 2019-05-03 RX ORDER — PROPOFOL 10 MG/ML
INJECTION, EMULSION INTRAVENOUS CONTINUOUS PRN
Status: DISCONTINUED | OUTPATIENT
Start: 2019-05-03 | End: 2019-05-03

## 2019-05-03 RX ORDER — ACETAMINOPHEN 325 MG/1
975 TABLET ORAL ONCE
Status: COMPLETED | OUTPATIENT
Start: 2019-05-03 | End: 2019-05-03

## 2019-05-03 RX ORDER — LIDOCAINE 40 MG/G
CREAM TOPICAL
Status: DISCONTINUED | OUTPATIENT
Start: 2019-05-03 | End: 2019-05-06 | Stop reason: HOSPADM

## 2019-05-03 RX ORDER — SODIUM CHLORIDE 9 MG/ML
INJECTION, SOLUTION INTRAVENOUS CONTINUOUS
Status: DISCONTINUED | OUTPATIENT
Start: 2019-05-03 | End: 2019-05-04

## 2019-05-03 RX ORDER — HYDROMORPHONE HYDROCHLORIDE 1 MG/ML
.3-.5 INJECTION, SOLUTION INTRAMUSCULAR; INTRAVENOUS; SUBCUTANEOUS
Status: DISCONTINUED | OUTPATIENT
Start: 2019-05-03 | End: 2019-05-06 | Stop reason: HOSPADM

## 2019-05-03 RX ORDER — ONDANSETRON 4 MG/1
4 TABLET, ORALLY DISINTEGRATING ORAL EVERY 30 MIN PRN
Status: DISCONTINUED | OUTPATIENT
Start: 2019-05-03 | End: 2019-05-03 | Stop reason: HOSPADM

## 2019-05-03 RX ORDER — SODIUM CHLORIDE, SODIUM LACTATE, POTASSIUM CHLORIDE, CALCIUM CHLORIDE 600; 310; 30; 20 MG/100ML; MG/100ML; MG/100ML; MG/100ML
INJECTION, SOLUTION INTRAVENOUS CONTINUOUS PRN
Status: DISCONTINUED | OUTPATIENT
Start: 2019-05-03 | End: 2019-05-03

## 2019-05-03 RX ORDER — ONDANSETRON 2 MG/ML
4-8 INJECTION INTRAMUSCULAR; INTRAVENOUS EVERY 6 HOURS PRN
Status: DISCONTINUED | OUTPATIENT
Start: 2019-05-03 | End: 2019-05-06 | Stop reason: HOSPADM

## 2019-05-03 RX ORDER — HYDRALAZINE HYDROCHLORIDE 20 MG/ML
10-20 INJECTION INTRAMUSCULAR; INTRAVENOUS EVERY 30 MIN PRN
Status: DISCONTINUED | OUTPATIENT
Start: 2019-05-03 | End: 2019-05-06 | Stop reason: HOSPADM

## 2019-05-03 RX ORDER — PROPOFOL 10 MG/ML
INJECTION, EMULSION INTRAVENOUS PRN
Status: DISCONTINUED | OUTPATIENT
Start: 2019-05-03 | End: 2019-05-03

## 2019-05-03 RX ORDER — CEFAZOLIN SODIUM 2 G/100ML
2 INJECTION, SOLUTION INTRAVENOUS
Status: COMPLETED | OUTPATIENT
Start: 2019-05-03 | End: 2019-05-03

## 2019-05-03 RX ORDER — DULOXETIN HYDROCHLORIDE 60 MG/1
60 CAPSULE, DELAYED RELEASE ORAL AT BEDTIME
Status: DISCONTINUED | OUTPATIENT
Start: 2019-05-03 | End: 2019-05-06 | Stop reason: HOSPADM

## 2019-05-03 RX ORDER — ONDANSETRON 2 MG/ML
4 INJECTION INTRAMUSCULAR; INTRAVENOUS EVERY 30 MIN PRN
Status: DISCONTINUED | OUTPATIENT
Start: 2019-05-03 | End: 2019-05-03 | Stop reason: HOSPADM

## 2019-05-03 RX ORDER — FENTANYL CITRATE 50 UG/ML
INJECTION, SOLUTION INTRAMUSCULAR; INTRAVENOUS PRN
Status: DISCONTINUED | OUTPATIENT
Start: 2019-05-03 | End: 2019-05-03

## 2019-05-03 RX ADMIN — FENTANYL CITRATE 25 MCG: 50 INJECTION, SOLUTION INTRAMUSCULAR; INTRAVENOUS at 12:17

## 2019-05-03 RX ADMIN — ONDANSETRON 4 MG: 2 INJECTION INTRAMUSCULAR; INTRAVENOUS at 11:14

## 2019-05-03 RX ADMIN — Medication 0.3 MG: at 13:03

## 2019-05-03 RX ADMIN — KETAMINE HYDROCHLORIDE 15 MG: 10 INJECTION, SOLUTION INTRAMUSCULAR; INTRAVENOUS at 10:12

## 2019-05-03 RX ADMIN — FENTANYL CITRATE 100 MCG: 50 INJECTION, SOLUTION INTRAMUSCULAR; INTRAVENOUS at 07:52

## 2019-05-03 RX ADMIN — OXYCODONE HYDROCHLORIDE 10 MG: 5 TABLET ORAL at 21:21

## 2019-05-03 RX ADMIN — REMIFENTANIL HYDROCHLORIDE 0.05 MCG/KG/MIN: 1 INJECTION, POWDER, LYOPHILIZED, FOR SOLUTION INTRAVENOUS at 08:50

## 2019-05-03 RX ADMIN — CEFAZOLIN SODIUM 2 G: 2 INJECTION, SOLUTION INTRAVENOUS at 08:50

## 2019-05-03 RX ADMIN — ROCURONIUM BROMIDE 50 MG: 10 INJECTION INTRAVENOUS at 07:53

## 2019-05-03 RX ADMIN — DEXAMETHASONE SODIUM PHOSPHATE 6 MG: 4 INJECTION, SOLUTION INTRA-ARTICULAR; INTRALESIONAL; INTRAMUSCULAR; INTRAVENOUS; SOFT TISSUE at 09:05

## 2019-05-03 RX ADMIN — LIDOCAINE HYDROCHLORIDE 100 MG: 20 INJECTION, SOLUTION INFILTRATION; PERINEURAL at 07:52

## 2019-05-03 RX ADMIN — KETAMINE HYDROCHLORIDE 25 MG: 10 INJECTION, SOLUTION INTRAMUSCULAR; INTRAVENOUS at 09:14

## 2019-05-03 RX ADMIN — HYDROMORPHONE HYDROCHLORIDE 0.25 MG: 1 INJECTION, SOLUTION INTRAMUSCULAR; INTRAVENOUS; SUBCUTANEOUS at 11:20

## 2019-05-03 RX ADMIN — PROPOFOL 75 MCG/KG/MIN: 10 INJECTION, EMULSION INTRAVENOUS at 08:50

## 2019-05-03 RX ADMIN — SODIUM CHLORIDE, SODIUM GLUCONATE, SODIUM ACETATE, POTASSIUM CHLORIDE AND MAGNESIUM CHLORIDE: 526; 502; 368; 37; 30 INJECTION, SOLUTION INTRAVENOUS at 08:45

## 2019-05-03 RX ADMIN — ACETAMINOPHEN 650 MG: 325 TABLET, FILM COATED ORAL at 19:54

## 2019-05-03 RX ADMIN — HYDROMORPHONE HYDROCHLORIDE 0.25 MG: 1 INJECTION, SOLUTION INTRAMUSCULAR; INTRAVENOUS; SUBCUTANEOUS at 11:23

## 2019-05-03 RX ADMIN — SODIUM CHLORIDE, POTASSIUM CHLORIDE, SODIUM LACTATE AND CALCIUM CHLORIDE: 600; 310; 30; 20 INJECTION, SOLUTION INTRAVENOUS at 07:40

## 2019-05-03 RX ADMIN — ACETAMINOPHEN 975 MG: 325 TABLET, FILM COATED ORAL at 07:29

## 2019-05-03 RX ADMIN — MIDAZOLAM 2 MG: 1 INJECTION INTRAMUSCULAR; INTRAVENOUS at 07:40

## 2019-05-03 RX ADMIN — Medication 0.5 MG: at 13:24

## 2019-05-03 RX ADMIN — Medication 0.2 MG: at 13:17

## 2019-05-03 RX ADMIN — FENTANYL CITRATE 50 MCG: 50 INJECTION, SOLUTION INTRAMUSCULAR; INTRAVENOUS at 12:32

## 2019-05-03 RX ADMIN — CEFAZOLIN 1 G: 1 INJECTION, POWDER, FOR SOLUTION INTRAMUSCULAR; INTRAVENOUS at 10:45

## 2019-05-03 RX ADMIN — BUPROPION HYDROCHLORIDE 150 MG: 150 TABLET, FILM COATED, EXTENDED RELEASE ORAL at 21:21

## 2019-05-03 RX ADMIN — SENNOSIDES AND DOCUSATE SODIUM 2 TABLET: 8.6; 5 TABLET ORAL at 19:54

## 2019-05-03 RX ADMIN — KETAMINE HYDROCHLORIDE 10 MG: 10 INJECTION, SOLUTION INTRAMUSCULAR; INTRAVENOUS at 10:49

## 2019-05-03 RX ADMIN — OXYCODONE HYDROCHLORIDE 10 MG: 5 TABLET ORAL at 15:10

## 2019-05-03 RX ADMIN — POLYETHYLENE GLYCOL 3350 17 G: 17 POWDER, FOR SOLUTION ORAL at 19:53

## 2019-05-03 RX ADMIN — SODIUM CHLORIDE: 9 INJECTION, SOLUTION INTRAVENOUS at 12:53

## 2019-05-03 RX ADMIN — FENTANYL CITRATE 50 MCG: 50 INJECTION INTRAMUSCULAR; INTRAVENOUS at 12:47

## 2019-05-03 RX ADMIN — ACETAMINOPHEN 650 MG: 325 TABLET, FILM COATED ORAL at 15:10

## 2019-05-03 RX ADMIN — SODIUM CHLORIDE, POTASSIUM CHLORIDE, SODIUM LACTATE AND CALCIUM CHLORIDE: 600; 310; 30; 20 INJECTION, SOLUTION INTRAVENOUS at 08:50

## 2019-05-03 RX ADMIN — FENTANYL CITRATE 25 MCG: 50 INJECTION INTRAMUSCULAR; INTRAVENOUS at 12:53

## 2019-05-03 RX ADMIN — OXYCODONE HYDROCHLORIDE 10 MG: 5 TABLET ORAL at 18:10

## 2019-05-03 RX ADMIN — FENTANYL CITRATE 25 MCG: 50 INJECTION INTRAMUSCULAR; INTRAVENOUS at 12:43

## 2019-05-03 RX ADMIN — DULOXETINE HYDROCHLORIDE 60 MG: 60 CAPSULE, DELAYED RELEASE ORAL at 21:21

## 2019-05-03 RX ADMIN — PROPOFOL 200 MG: 10 INJECTION, EMULSION INTRAVENOUS at 07:52

## 2019-05-03 ASSESSMENT — ACTIVITIES OF DAILY LIVING (ADL)
ADLS_ACUITY_SCORE: 13
ADLS_ACUITY_SCORE: 13

## 2019-05-03 ASSESSMENT — PAIN DESCRIPTION - DESCRIPTORS
DESCRIPTORS: ACHING
DESCRIPTORS: ACHING

## 2019-05-03 NOTE — ANESTHESIA POSTPROCEDURE EVALUATION
Anesthesia POST Procedure Evaluation    Patient: Heaven Marx   MRN:     [id number] Gender:   female   Age:    28 year old :      1990        Preoperative Diagnosis: Tethered Cord   Procedure(s):  Spinal Cord Untethering, Adhesion Lysis   Postop Comments: No value filed.       Anesthesia Type:  General  No value filed.    Reportable Event: NO     PAIN: Uncomplicated   Sign Out status: Comfortable, Well controlled pain     PONV: No PONV   Sign Out status:  No Nausea or Vomiting     Neuro/Psych: Uneventful perioperative course   Sign Out Status: Preoperative baseline; Age appropriate mentation     Airway/Resp.: Uneventful perioperative course   Sign Out Status: Non labored breathing, age appropriate RR; Resp. Status within EXPECTED Parameters     CV: Uneventful perioperative course   Sign Out status: Appropriate BP and perfusion indices; Appropriate HR/Rhythm     Disposition:   Sign Out in:  PACU  Disposition:  Floor  Recovery Course: Uneventful  Follow-Up: Not required     Comments/Narrative:  Patient had a chest x-ray performed in the recovery room for line placement.  I personally reviewed the chest x-ray which shows the triple-lumen catheter in good position in the superior vena cava no evidence of pneumothorax.  The line is okay to use.           Last Anesthesia Record Vitals:  CRNA VITALS  5/3/2019 1154 - 5/3/2019 1254      5/3/2019             Resp Rate (observed):  1  (Abnormal)           Last PACU Vitals:  Vitals Value Taken Time   /86 5/3/2019  1:10 PM   Temp 36.9  C (98.4  F) 5/3/2019  1:00 PM   Pulse 106 5/3/2019  1:10 PM   Resp 22 5/3/2019  1:00 PM   SpO2 98 % 5/3/2019  1:18 PM   Temp src     NIBP     Pulse     SpO2     Resp     Temp     Ht Rate     Temp 2     Vitals shown include unvalidated device data.      Electronically Signed By: Yuriy Shankar MD, May 3, 2019, 1:19 PM

## 2019-05-03 NOTE — OR NURSING
Dr. Shankar ([facility]) at bedside, CXR read and okay to use Right internal jugular CVC. Will place sign out when able.

## 2019-05-03 NOTE — PLAN OF CARE
Status: Arrived post op at 2pm from untethering of spinal cord.  Vitals: Stable  Neuros: motor intact, denies N/T.  IV: at 75cc/hr via TL jugular line. PIV SL'd .she is wanting jugular line out.  Resp/trach: clear. Given and instructed on IS.  Diet: ice cream and water  Bowel status: +BS  : Giron in place  Skin: dressing with shadow drainage marked.  Pain: tylenol and oxycodone for pain.  Activity: FBR is turning side to side with min assist.  Social: mom and dad at bedside  Plan: pt reports FBR is for 48 hours.

## 2019-05-03 NOTE — ANESTHESIA PROCEDURE NOTES
Central Line Procedure Note  Staff:     Anesthesiologist:  Yuriy Shankar MD    Resident/CRNA:  Everette Conte DO    Central line placed by Resident/CRNA in the presence of a teaching physician    Location: In OR after induction  Procedure Start/Stop Times:     patient identified, IV checked, site marked, risks and benefits discussed, informed consent, monitors and equipment checked, pre-op evaluation and at physician/surgeon's request      Correct Patient: Yes      Correct Position: Yes      Correct Site: Yes      Correct Procedure: Yes      Correct Laterality:  Yes    Site Marked:  Yes  Line Placement:     Procedure:  Central Line    Insertion laterality:  Right    Insertion site:  Internal Jugular    Position:  Trendelenburg  Skin Prep: Chloraprep         Injection Technique:  Ultrasound guided    Sterile Ultrasound Technique:  Sterile probe cover and Sterile gel    Vein evaluated via U/S for patency/adequacy of catheter insertion and is adequate.  Using realtime U/S imaging the vein was punctured, and needle was observed entering vein on U/S      A permanent image is NOT entered into the patient's record.      Local skin infiltration:  None    Catheter size:  7 Fr, 3 lumen, 20 cm    Catheter length at skin (cm):  15    Cath secured with: suture      Dressing:  Tegaderm and Biopatch    Complications:  None obvious    Blood aspirated all lumens: Yes      All Lumens Flushed: Yes

## 2019-05-03 NOTE — ANESTHESIA CARE TRANSFER NOTE
Patient: Heaven Pirila    Procedure(s):  Spinal Cord Untethering, Adhesion Lysis    Diagnosis: Tethered Cord  Diagnosis Additional Information: No value filed.    Anesthesia Type:   No value filed.     Note:  Airway :Face Mask  Patient transferred to:PACU  Comments: VSS.  Report given to RN.Handoff Report: Identifed the Patient, Identified the Reponsible Provider, Reviewed the pertinent medical history, Discussed the surgical course, Reviewed Intra-OP anesthesia mangement and issues during anesthesia, Set expectations for post-procedure period and Allowed opportunity for questions and acknowledgement of understanding      Vitals: (Last set prior to Anesthesia Care Transfer)    CRNA VITALS  5/3/2019 1154 - 5/3/2019 1230      5/3/2019             Resp Rate (observed):  BP  HR  Sat  12  126/76  107  100                Electronically Signed By: MARTINA Medina CRNA  May 3, 2019  12:30 PM

## 2019-05-04 LAB
ANION GAP SERPL CALCULATED.3IONS-SCNC: 4 MMOL/L (ref 3–14)
BUN SERPL-MCNC: 8 MG/DL (ref 7–30)
CALCIUM SERPL-MCNC: 8.5 MG/DL (ref 8.5–10.1)
CHLORIDE SERPL-SCNC: 108 MMOL/L (ref 94–109)
CO2 SERPL-SCNC: 28 MMOL/L (ref 20–32)
CREAT SERPL-MCNC: 0.6 MG/DL (ref 0.52–1.04)
ERYTHROCYTE [DISTWIDTH] IN BLOOD BY AUTOMATED COUNT: 13.4 % (ref 10–15)
GFR SERPL CREATININE-BSD FRML MDRD: >90 ML/MIN/{1.73_M2}
GLUCOSE SERPL-MCNC: 96 MG/DL (ref 70–99)
HCT VFR BLD AUTO: 36.5 % (ref 35–47)
HGB BLD-MCNC: 11.8 G/DL (ref 11.7–15.7)
MCH RBC QN AUTO: 29.9 PG (ref 26.5–33)
MCHC RBC AUTO-ENTMCNC: 32.3 G/DL (ref 31.5–36.5)
MCV RBC AUTO: 93 FL (ref 78–100)
PHOSPHATE SERPL-MCNC: 3.4 MG/DL (ref 2.5–4.5)
PLATELET # BLD AUTO: 285 10E9/L (ref 150–450)
POTASSIUM SERPL-SCNC: 3.6 MMOL/L (ref 3.4–5.3)
RBC # BLD AUTO: 3.94 10E12/L (ref 3.8–5.2)
SODIUM SERPL-SCNC: 139 MMOL/L (ref 133–144)
WBC # BLD AUTO: 15 10E9/L (ref 4–11)

## 2019-05-04 PROCEDURE — 25000132 ZZH RX MED GY IP 250 OP 250 PS 637: Performed by: STUDENT IN AN ORGANIZED HEALTH CARE EDUCATION/TRAINING PROGRAM

## 2019-05-04 PROCEDURE — 84100 ASSAY OF PHOSPHORUS: CPT | Performed by: STUDENT IN AN ORGANIZED HEALTH CARE EDUCATION/TRAINING PROGRAM

## 2019-05-04 PROCEDURE — 25000128 H RX IP 250 OP 636: Performed by: STUDENT IN AN ORGANIZED HEALTH CARE EDUCATION/TRAINING PROGRAM

## 2019-05-04 PROCEDURE — 80048 BASIC METABOLIC PNL TOTAL CA: CPT | Performed by: STUDENT IN AN ORGANIZED HEALTH CARE EDUCATION/TRAINING PROGRAM

## 2019-05-04 PROCEDURE — 85027 COMPLETE CBC AUTOMATED: CPT | Performed by: STUDENT IN AN ORGANIZED HEALTH CARE EDUCATION/TRAINING PROGRAM

## 2019-05-04 PROCEDURE — 12000001 ZZH R&B MED SURG/OB UMMC

## 2019-05-04 PROCEDURE — 36592 COLLECT BLOOD FROM PICC: CPT | Performed by: STUDENT IN AN ORGANIZED HEALTH CARE EDUCATION/TRAINING PROGRAM

## 2019-05-04 RX ADMIN — OXYCODONE HYDROCHLORIDE 10 MG: 5 TABLET ORAL at 15:54

## 2019-05-04 RX ADMIN — ACETAMINOPHEN 650 MG: 325 TABLET, FILM COATED ORAL at 09:19

## 2019-05-04 RX ADMIN — OXYCODONE HYDROCHLORIDE 10 MG: 5 TABLET ORAL at 20:20

## 2019-05-04 RX ADMIN — OXYCODONE HYDROCHLORIDE 10 MG: 5 TABLET ORAL at 23:17

## 2019-05-04 RX ADMIN — ACETAMINOPHEN 650 MG: 325 TABLET, FILM COATED ORAL at 22:20

## 2019-05-04 RX ADMIN — POLYETHYLENE GLYCOL 3350 17 G: 17 POWDER, FOR SOLUTION ORAL at 20:25

## 2019-05-04 RX ADMIN — ACETAMINOPHEN 650 MG: 325 TABLET, FILM COATED ORAL at 17:50

## 2019-05-04 RX ADMIN — ACETAMINOPHEN 650 MG: 325 TABLET, FILM COATED ORAL at 00:32

## 2019-05-04 RX ADMIN — BUPROPION HYDROCHLORIDE 150 MG: 150 TABLET, FILM COATED, EXTENDED RELEASE ORAL at 20:20

## 2019-05-04 RX ADMIN — OXYCODONE HYDROCHLORIDE 10 MG: 5 TABLET ORAL at 12:25

## 2019-05-04 RX ADMIN — OXYCODONE HYDROCHLORIDE 10 MG: 5 TABLET ORAL at 09:19

## 2019-05-04 RX ADMIN — SENNOSIDES AND DOCUSATE SODIUM 2 TABLET: 8.6; 5 TABLET ORAL at 20:21

## 2019-05-04 RX ADMIN — OXYCODONE HYDROCHLORIDE 10 MG: 5 TABLET ORAL at 00:32

## 2019-05-04 RX ADMIN — ACETAMINOPHEN 650 MG: 325 TABLET, FILM COATED ORAL at 05:02

## 2019-05-04 RX ADMIN — DULOXETINE HYDROCHLORIDE 60 MG: 60 CAPSULE, DELAYED RELEASE ORAL at 20:21

## 2019-05-04 RX ADMIN — OXYCODONE HYDROCHLORIDE 10 MG: 5 TABLET ORAL at 06:29

## 2019-05-04 RX ADMIN — OXYCODONE HYDROCHLORIDE 10 MG: 5 TABLET ORAL at 03:36

## 2019-05-04 RX ADMIN — ACETAMINOPHEN 650 MG: 325 TABLET, FILM COATED ORAL at 13:30

## 2019-05-04 RX ADMIN — Medication 0.3 MG: at 10:57

## 2019-05-04 ASSESSMENT — ACTIVITIES OF DAILY LIVING (ADL)
ADLS_ACUITY_SCORE: 15
ADLS_ACUITY_SCORE: 15
ADLS_ACUITY_SCORE: 13
ADLS_ACUITY_SCORE: 13
ADLS_ACUITY_SCORE: 15
ADLS_ACUITY_SCORE: 13

## 2019-05-04 NOTE — PLAN OF CARE
[id number]  Status: POD#1 s/p Spinal Cord Untethering, Adhesion Lysis, Filum Lysis  Vitals: VSS  Neuros: Intact  IV: R CVC saline locked  Resp/trach: Lung sounds clear throughout, IS at bedside  Diet: Regular diet  Bowel status: No BM, BS+, passing gas  : Giron catheter in place   Skin: Back incision with primipore, drainage marked, no extension  Pain: Patient c/o back pain, partial relief from Tylenol and Oxycodone, IV Dilaudid given  Activity: Strict flat bedrest for 48 hours  Social: Family at bedside, supportive with cares  Plan: PT/OT evaluations after bedrest, continue to monitor and follow POC

## 2019-05-04 NOTE — PLAN OF CARE
Status: POD#1 spinal cord untethering & adhesion lysis.  Vitals: VSS on RA   Neuros: 5/5 all extremities, denies N/T. No neuro deficits observed.   IV: TL IJ SL.   Resp/trach: LS clear.   Diet: Reg diet. No nausea.   Bowel status: +BS. No BM over shift.   : Giron in place w/ adequate UO. PMH of neurogenic bladder.   Skin: Marked drainage on back incision dressing. No change noted over shift.   Pain: PRN tylenol and oxycodone for pain. Cold packs applied. Moderate pain control.   Activity: FBR, compliant. Pt is turning side to side independently.     Social: No visitors overnight.   Plan: Per report, FBR for 48 hr post op. Giron for 48 hr post op. Continue to monitor & follow POC.

## 2019-05-04 NOTE — PROGRESS NOTES
"Neurosurgery Progress Note    S: no leak from incision    O:  Temp:  [96  F (35.6  C)-98.4  F (36.9  C)] 96.6  F (35.9  C)  Pulse:  [] 71  Heart Rate:  [] 93  Resp:  [16-22] 16  BP: (102-142)/(47-86) 107/51  SpO2:  [93 %-100 %] 93 %    Exam:  General: Awake;  Alert, In No Acute Distress  Mental status: Oriented x 3  Cranial Nerves: Cranial Nerves II-XII Intact Bilaterally  Strength:      Del Tr Bi WE WF Gr  R 5 5 5 5 5 5  L 5 5 5 5 5 5     HF KE KF DF PF   R 5 5 5 5 5   L 5 5 5 5 5     Pronator Drift: Absent  Sensory: Intact to Light Touch  INCISION: clean, dry    Assessment:   28 year old female s/p Spinal Cord Untethering, Adhesion Lysis, Filum Lysis, doing well post-op.    Plan by problem:     Neuro:   Patient to remain on flat bedrest for 48 hours; mobilize on 5/5/19  Sutures out: 5/17    Cardiovascular: blood pressure goals: SBP < 160    Pulmonary:   Incentive spirometry     Gastrointestinal:  advance diet     Renal: history of neurogenic bladder, continue carmichael post operatively      Heme: no issues    Endocrine: No issues     Infectious Monitor for fever     PT/OT: pending     DVT prophylaxis: Sequential compression devices     Ulcer prophylaxis: none indicated     DISPO:  Anticipate D/C 5/6    Barriers: completion of bedrest, evaluation by therapies     Harshil \"Tulio\" MD Sarai   Neurosurgery, PGY-2    Please contact neurosurgery resident on call with questions.    Dial * [phone], enter [phone] when prompted.     "

## 2019-05-04 NOTE — PLAN OF CARE
AVSS,  neuros intact.  Tolerating regular diet and oral fluids.  Back dressing with serosanguinous drainage.  On flat bedrest, independently turning.  Pain controlled tylenol and oxycodone and cold packs.  Plan bedrest x 48 hours.

## 2019-05-05 ENCOUNTER — APPOINTMENT (OUTPATIENT)
Dept: PHYSICAL THERAPY | Facility: CLINIC | Age: 29
End: 2019-05-05
Attending: NEUROLOGICAL SURGERY
Payer: COMMERCIAL

## 2019-05-05 LAB
ANION GAP SERPL CALCULATED.3IONS-SCNC: 6 MMOL/L (ref 3–14)
BUN SERPL-MCNC: 10 MG/DL (ref 7–30)
CALCIUM SERPL-MCNC: 8.2 MG/DL (ref 8.5–10.1)
CHLORIDE SERPL-SCNC: 106 MMOL/L (ref 94–109)
CO2 SERPL-SCNC: 28 MMOL/L (ref 20–32)
CREAT SERPL-MCNC: 0.66 MG/DL (ref 0.52–1.04)
ERYTHROCYTE [DISTWIDTH] IN BLOOD BY AUTOMATED COUNT: 14.1 % (ref 10–15)
GFR SERPL CREATININE-BSD FRML MDRD: >90 ML/MIN/{1.73_M2}
GLUCOSE SERPL-MCNC: 94 MG/DL (ref 70–99)
HCT VFR BLD AUTO: 35.8 % (ref 35–47)
HGB BLD-MCNC: 11.5 G/DL (ref 11.7–15.7)
LACTATE BLD-SCNC: 0.6 MMOL/L (ref 0.7–2)
MCH RBC QN AUTO: 30.3 PG (ref 26.5–33)
MCHC RBC AUTO-ENTMCNC: 32.1 G/DL (ref 31.5–36.5)
MCV RBC AUTO: 95 FL (ref 78–100)
PHOSPHATE SERPL-MCNC: 2.4 MG/DL (ref 2.5–4.5)
PLATELET # BLD AUTO: 262 10E9/L (ref 150–450)
POTASSIUM SERPL-SCNC: 3.3 MMOL/L (ref 3.4–5.3)
RBC # BLD AUTO: 3.79 10E12/L (ref 3.8–5.2)
SODIUM SERPL-SCNC: 140 MMOL/L (ref 133–144)
WBC # BLD AUTO: 10.5 10E9/L (ref 4–11)

## 2019-05-05 PROCEDURE — 25000132 ZZH RX MED GY IP 250 OP 250 PS 637: Performed by: STUDENT IN AN ORGANIZED HEALTH CARE EDUCATION/TRAINING PROGRAM

## 2019-05-05 PROCEDURE — 36592 COLLECT BLOOD FROM PICC: CPT | Performed by: STUDENT IN AN ORGANIZED HEALTH CARE EDUCATION/TRAINING PROGRAM

## 2019-05-05 PROCEDURE — 12000001 ZZH R&B MED SURG/OB UMMC

## 2019-05-05 PROCEDURE — 25000128 H RX IP 250 OP 636: Performed by: STUDENT IN AN ORGANIZED HEALTH CARE EDUCATION/TRAINING PROGRAM

## 2019-05-05 PROCEDURE — 83605 ASSAY OF LACTIC ACID: CPT

## 2019-05-05 PROCEDURE — 97161 PT EVAL LOW COMPLEX 20 MIN: CPT | Mod: GP | Performed by: PHYSICAL THERAPIST

## 2019-05-05 PROCEDURE — 84100 ASSAY OF PHOSPHORUS: CPT | Performed by: STUDENT IN AN ORGANIZED HEALTH CARE EDUCATION/TRAINING PROGRAM

## 2019-05-05 PROCEDURE — 25000131 ZZH RX MED GY IP 250 OP 636 PS 637: Performed by: STUDENT IN AN ORGANIZED HEALTH CARE EDUCATION/TRAINING PROGRAM

## 2019-05-05 PROCEDURE — 97530 THERAPEUTIC ACTIVITIES: CPT | Mod: GP | Performed by: PHYSICAL THERAPIST

## 2019-05-05 PROCEDURE — 85027 COMPLETE CBC AUTOMATED: CPT | Performed by: STUDENT IN AN ORGANIZED HEALTH CARE EDUCATION/TRAINING PROGRAM

## 2019-05-05 PROCEDURE — 80048 BASIC METABOLIC PNL TOTAL CA: CPT | Performed by: STUDENT IN AN ORGANIZED HEALTH CARE EDUCATION/TRAINING PROGRAM

## 2019-05-05 RX ORDER — HEPARIN SODIUM,PORCINE 10 UNIT/ML
5-10 VIAL (ML) INTRAVENOUS EVERY 24 HOURS
Status: DISCONTINUED | OUTPATIENT
Start: 2019-05-05 | End: 2019-05-06 | Stop reason: HOSPADM

## 2019-05-05 RX ORDER — BISACODYL 10 MG
10 SUPPOSITORY, RECTAL RECTAL DAILY
Status: DISCONTINUED | OUTPATIENT
Start: 2019-05-06 | End: 2019-05-06 | Stop reason: HOSPADM

## 2019-05-05 RX ORDER — HEPARIN SODIUM,PORCINE 10 UNIT/ML
5-10 VIAL (ML) INTRAVENOUS
Status: DISCONTINUED | OUTPATIENT
Start: 2019-05-05 | End: 2019-05-06 | Stop reason: HOSPADM

## 2019-05-05 RX ADMIN — BUPROPION HYDROCHLORIDE 150 MG: 150 TABLET, FILM COATED, EXTENDED RELEASE ORAL at 21:31

## 2019-05-05 RX ADMIN — ONDANSETRON 4 MG: 4 TABLET, ORALLY DISINTEGRATING ORAL at 12:13

## 2019-05-05 RX ADMIN — OXYCODONE HYDROCHLORIDE 10 MG: 5 TABLET ORAL at 02:35

## 2019-05-05 RX ADMIN — DULOXETINE HYDROCHLORIDE 60 MG: 60 CAPSULE, DELAYED RELEASE ORAL at 21:31

## 2019-05-05 RX ADMIN — OXYCODONE HYDROCHLORIDE 5 MG: 5 TABLET ORAL at 19:50

## 2019-05-05 RX ADMIN — OXYCODONE HYDROCHLORIDE 10 MG: 5 TABLET ORAL at 09:49

## 2019-05-05 RX ADMIN — ACETAMINOPHEN 650 MG: 325 TABLET, FILM COATED ORAL at 02:35

## 2019-05-05 RX ADMIN — Medication 5 ML: at 09:46

## 2019-05-05 RX ADMIN — ACETAMINOPHEN 650 MG: 325 TABLET, FILM COATED ORAL at 06:37

## 2019-05-05 RX ADMIN — SENNOSIDES AND DOCUSATE SODIUM 1 TABLET: 8.6; 5 TABLET ORAL at 19:51

## 2019-05-05 RX ADMIN — OXYCODONE HYDROCHLORIDE 10 MG: 5 TABLET ORAL at 05:33

## 2019-05-05 RX ADMIN — POLYETHYLENE GLYCOL 3350 17 G: 17 POWDER, FOR SOLUTION ORAL at 12:51

## 2019-05-05 RX ADMIN — ACETAMINOPHEN 650 MG: 325 TABLET, FILM COATED ORAL at 12:50

## 2019-05-05 RX ADMIN — OXYCODONE HYDROCHLORIDE 5 MG: 5 TABLET ORAL at 15:42

## 2019-05-05 ASSESSMENT — ACTIVITIES OF DAILY LIVING (ADL)
ADLS_ACUITY_SCORE: 13
ADLS_ACUITY_SCORE: 13
ADLS_ACUITY_SCORE: 11
ADLS_ACUITY_SCORE: 13
ADLS_ACUITY_SCORE: 11
ADLS_ACUITY_SCORE: 13

## 2019-05-05 NOTE — PLAN OF CARE
OT/6A: Defer. Therapies notified bedrest discontinued at 12 PM. PT evaluating and educating on spinal precautions/activity modifications. Per PT, patient demonstrating mobility, transfers, and LB dressing using figure four SBA. Declines shower chair at this time.  available to assist with heavy IADLs. No acute IP OT needs at this time. Will complete orders. Please re-consult if new needs arise.

## 2019-05-05 NOTE — PLAN OF CARE
[id number]  Status: POD#1 s/p Spinal Cord Untethering, Adhesion Lysis, Filum Lysis  Vitals: VSS  Neuros: Intact  IV: R CVC SL   Resp/trach: Lung sounds clear, IS at bedside and encouraged use.   Diet: Regular diet  Bowel status: No BM, pt educated on importance of maintaining good bowel habits. Pt refused afternoon Miralax but took bedtime doses.   : Giron catheter in place, good UOP   Skin: Back incision with primipore, drainage marked, no extension  Pain: C/o back pain, controlled with oxy/tylenol.   Activity: Strict flat bedrest for 48hrs, BA on, SCDs on.   Social: No family present but pt keeping in touch via facetime.   Plan: PT/OT evaluations after bedrest, continue to monitor and follow POC

## 2019-05-05 NOTE — OP NOTE
Procedure Date:   May 3, 2019    PREOPERATIVE DIAGNOSES:   1.  Diastematomyelia  2.  Tethered cord      POSTOPERATIVE DIAGNOSES:   same     PROCEDURE PERFORMED:   1.  Redo untethering of spinal cord  2.  Intraoperative EMG monitoring  3.  Interpretation of radiograph for localization  4.  Microscope dissection      ATTENDING SURGEON: Salomón Renner MD      RESIDENT SURGEON:  Jack Leschke MD      ANESTHESIA:  General      ESTIMATED BLOOD LOSS:  10cc    FINDINGS:  Tethered cord with scar and arachnoid adhesions successfully released. No EMG changes.    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  Unfortunately she has had recurrence of her symptoms.  Her left lower extremity is weak, coinciding with radiating pain to her left buttock that worsens with activity.  She continues to have baseline bowel and bladder problems.  We believe strongly the symptoms were attributable to re-tethering after reviewing her MRI. Written consent was obtained for the operation after careful discussion of the risks and benefits.      DESCRIPTION OF PROCEDURE:  The patient was brought to the operating room where general endotracheal anesthesia was induced.  IV access was obtained.  The patient was positioned prone and prepped/draped in the standard fashion.  All pressure points were padded. Perioperative antibiotics were administered and SCDs were applied. EMG monitoring was performed. A localizing xray was performed sterilely with a spinal needle and confirmed the inferior aspect of our incision at L4-5.      After conducting an appropriate timeout, her prior incision was opened, with care and caution over the midline at especially the L2-3 level, the site of her previous laminectomy.  Our incision was made with a #15 blade and then cerebellar retractors provided our operative corridor.  Bovie cautery was used to dissect carefully through the  avascular midline and scar.  The lamina of L1 was identified and this oriented us as to our safe depth.  Hemostasis was achieved with a combination of bipolar cautery and Gelfoam when needed throughout the case.  Surgi-Jose was also used.  Once the lamina of L1 was identified, Hamilton instrument was used to detach muscle from the bone and dissect in a subperiosteal plane.  A curved curette was used to undermine the lamina and then the #2 and #3 Kerrison instruments were used to remove a small edge of the L1 lamina.  Epidural fat was identified and ultimately underlying normal dura was exposed.  With the use of the Pacific instrument and gentle elevation, further dissection with the #15 blade allowed for exposure of the dural surface.  There was visible Prolene dural closure at the caudal aspect of our exposure.    The microscope was brought in and that the dura was opened with a #15 blade.  Geralds with teeth were used to reflect the dura and the Pacific instrument was used to provide protection.  The dural edges were tacked up with 4-0 Nurolon sutures.    A Rhoton 6 instrument as well as the Pacific were used to traverse and explore the intradural space along the margins.  Care was taken to not mobilize the split cord unnecessarily  at this level.  Bipolar cautery as well as microscissors were used to release small arachnoid adhesions.  Gentle passage of the Rhoton was achieved in all trajectories until about the midway point of the dural opening.  At this point, (~L2) there were much thicker arachnoid adhesions as well as scarring.  Our efforts focused principally on this area.  Bilaterally the assistant would elevate portions of scar with the Rhoton 6 bringing into the view the arachnoid/intradural attachment.  Microscissors were used to carefully cut the adhesions.  We worked circumferentially around this area of attachment.  Extra care was taken to not injur the dura laterally when releasing attachments.  Once we  were finished with the de-tethering at this level, there was obvious relaxation of the spinal cord.  It was now more elevated in our dural opening, assuming a more natural position. The nerve roots were visible distally and no further areas of tethering were discovered after careful inspection.     The dura was closed with a running 6-0 Prolene suture followed by Eviseal. A valsalva maneuver confirmed no CSF leak. Muscle and fascia were closed with interrupted O-vicryl. 2-0 vicryl were used to close the subcutaneous tissue in 2 layers, followed by a running 3-0 nylon suture at the skin's surface.     The wounds were then cleaned with wet and dry sponges followed by ChloraPrep.  A sterile dressing was placed over the incision thereafter.  The drapes were taken down and the patient was turned back onto the hospital bed. The patient was extubated prior to being transferred to the postanesthesia care unit. Instrument, needle and sponge counts were correct at the end of the operation. She remained flat and kept her carmichael in, with clear instruction to continue for 48 hours.       Dr. Renner was present during the entire operation.             JOHN M. LESCHKE, MD     I agree with the operative report as documented in the resident's note.    I was present for the entire procedure.

## 2019-05-05 NOTE — PLAN OF CARE
Status: POD#2 s/p Spinal Cord Untethering, Adhesion Lysis, Filum Lysis  Vitals: Tachy at times. OVSS on RA.   Neuros: Intact  IV: R CVC SL  Labs: Pt triggered sepsis protocol in 2300 hour. Lactic acid drawn around midnight = 0.6.   Resp/trach: Lung sounds clear.   Diet: Reg diet  Bowel status: No BM yet. Pt had bowel meds at bedtime.    : Giron catheter in place, adequate UOP.    Skin: MD removed dressing & assessed incision at bedside. Incision is KATHY, WNL.  Pain: C/o back pain, controlled w/ PRN oxy & tylenol. T-Pump in place, pt states it helps provide some relief.   Activity: Flat bed rest maintained. Pt able to independently reposition side to side.   Social: No visitors overnight.   Plan: PT/OT evaluations after bedrest. 48 hours will elapse later today 5/5. Continue to monitor and follow POC

## 2019-05-05 NOTE — PROGRESS NOTES
"S: patient anticipating getting liberalized from flat bedrest today. No acute events overnight. Passing gas. Tolerating oral diet.     O:  Exam:  General: Awake;  Alert, In No Acute Distress  Mental status: Oriented x 3  Cranial Nerves: Cranial Nerves II-XII Intact Bilaterally  Strength:      Del Tr Bi WE WF Gr  R 5 5 5 5 5 5  L 5 5 5 5 5 5     HF KE KF DF PF   R 5 5 5 5 5   L 5 5 5 5 5   Pronator Drift: Absent  Sensory: Intact to Light Touch  INCISION: clean, dry    Assessment:   28 year old female s/p Spinal Cord Untethering, Adhesion Lysis, Filum Lysis, doing well post-op.    Plan by problem:     Neuro:   Patient to remain on flat bedrest for 48 hours; mobilize on 5/5/19 until 12:00pm   Sutures out: 5/17    Cardiovascular: blood pressure goals: SBP < 160    Pulmonary:   Incentive spirometry     Gastrointestinal:  advance diet     Renal: continue carmichael until activity liberalized    Heme: no issues    Endocrine: No issues     Infectious Monitor for fever     PT/OT: pending     DVT prophylaxis: Sequential compression devices     Ulcer prophylaxis: none indicated     DISPO:  Anticipate D/C 5/6    Barriers: completion of bedrest    Chua \"Tulio\" MD Sarai   Neurosurgery, PGY-2    Please contact neurosurgery resident on call with questions.    Dial * * [phone], enter [phone] when prompted.     "

## 2019-05-05 NOTE — PLAN OF CARE
Discharge Planner PT   Patient plan for discharge: home with assist from .   Current status: Pt educated on spinal precautions and activity modifications for transfers, mobility, ADLs. Pt transfers with SBA, ambulated 100 + 350 ft in hallway with SBA, negotiated stairs without difficulty.   Barriers to return to prior living situation: medical status  Recommendations for discharge: home with assist from  (who is taking 1 week off work)  Rationale for recommendation: pt is safe for discharge to home with assist for lifting/heavy chores.        Entered by: Paula Drew 05/05/2019 2:51 PM

## 2019-05-05 NOTE — PLAN OF CARE
Status: POD#2 s/p Spinal Cord Untethering, Adhesion Lysis, Filum Lysis  Vitals: VSS on RA.   Neuros: Intact  IV: R CVC SL  Resp/trach: Lung sounds clear.   Diet: Reg diet. Appetite good. Had some nausea when she sat up after being on bedrest. Zofran given with relief.  Bowel status: No BM yet. Miralax given.  : Giron catheter removed at 1200. Pt has not voided yet.   Skin: MD removed dressing & assessed incision at bedside. Incision is KATHY, WNL.  Pain: C/o back pain, controlled w/ PRN oxy & tylenol. T-Pump in use.  Activity: Flat bed rest maintained until 1200. Pt able to sit on edge of bed with some lightheadedness noted. PT will see pt and do an evaluation this afternoon.  Social: No visitors.  Plan: Possible discharge tomorrow.

## 2019-05-05 NOTE — PLAN OF CARE
PT 6A: CANCEL- Orders received for PT evaluation. Per chart review, patient on strict bedrest for 48 hours until 5pm tonight (Order placed at 1700 on 5/03). Will reschedule  PT evaluation.

## 2019-05-05 NOTE — PLAN OF CARE
Physical Therapy Discharge Summary    Reason for therapy discharge:    All goals and outcomes met, no further needs identified.    Progress towards therapy goal(s). See goals on Care Plan in [facility] electronic health record for goal details.  Goals met    Therapy recommendation(s):    No further therapy is recommended.

## 2019-05-05 NOTE — PLAN OF CARE
OT/6A: OT orders acknowledged. Per chart review, patient on strict bedrest for 48 hours until 5pm tonight (Order placed at 1700 on 5/03). Will reschedule evaluation for tomorrow.

## 2019-05-06 VITALS
DIASTOLIC BLOOD PRESSURE: 58 MMHG | RESPIRATION RATE: 16 BRPM | HEART RATE: 71 BPM | TEMPERATURE: 98 F | SYSTOLIC BLOOD PRESSURE: 108 MMHG | OXYGEN SATURATION: 99 %

## 2019-05-06 LAB
ANION GAP SERPL CALCULATED.3IONS-SCNC: 5 MMOL/L (ref 3–14)
BUN SERPL-MCNC: 7 MG/DL (ref 7–30)
CALCIUM SERPL-MCNC: 8.6 MG/DL (ref 8.5–10.1)
CHLORIDE SERPL-SCNC: 104 MMOL/L (ref 94–109)
CO2 SERPL-SCNC: 30 MMOL/L (ref 20–32)
CREAT SERPL-MCNC: 0.6 MG/DL (ref 0.52–1.04)
ERYTHROCYTE [DISTWIDTH] IN BLOOD BY AUTOMATED COUNT: 13.8 % (ref 10–15)
GFR SERPL CREATININE-BSD FRML MDRD: >90 ML/MIN/{1.73_M2}
GLUCOSE SERPL-MCNC: 95 MG/DL (ref 70–99)
HCT VFR BLD AUTO: 33.5 % (ref 35–47)
HGB BLD-MCNC: 11.2 G/DL (ref 11.7–15.7)
MCH RBC QN AUTO: 30.4 PG (ref 26.5–33)
MCHC RBC AUTO-ENTMCNC: 33.4 G/DL (ref 31.5–36.5)
MCV RBC AUTO: 91 FL (ref 78–100)
PHOSPHATE SERPL-MCNC: 3.5 MG/DL (ref 2.5–4.5)
PLATELET # BLD AUTO: 270 10E9/L (ref 150–450)
POTASSIUM SERPL-SCNC: 3.4 MMOL/L (ref 3.4–5.3)
RBC # BLD AUTO: 3.69 10E12/L (ref 3.8–5.2)
SODIUM SERPL-SCNC: 138 MMOL/L (ref 133–144)
WBC # BLD AUTO: 8.3 10E9/L (ref 4–11)

## 2019-05-06 PROCEDURE — 80048 BASIC METABOLIC PNL TOTAL CA: CPT | Performed by: STUDENT IN AN ORGANIZED HEALTH CARE EDUCATION/TRAINING PROGRAM

## 2019-05-06 PROCEDURE — 84100 ASSAY OF PHOSPHORUS: CPT | Performed by: STUDENT IN AN ORGANIZED HEALTH CARE EDUCATION/TRAINING PROGRAM

## 2019-05-06 PROCEDURE — 85027 COMPLETE CBC AUTOMATED: CPT | Performed by: STUDENT IN AN ORGANIZED HEALTH CARE EDUCATION/TRAINING PROGRAM

## 2019-05-06 PROCEDURE — 25000132 ZZH RX MED GY IP 250 OP 250 PS 637: Performed by: STUDENT IN AN ORGANIZED HEALTH CARE EDUCATION/TRAINING PROGRAM

## 2019-05-06 PROCEDURE — 36592 COLLECT BLOOD FROM PICC: CPT | Performed by: STUDENT IN AN ORGANIZED HEALTH CARE EDUCATION/TRAINING PROGRAM

## 2019-05-06 PROCEDURE — 25000128 H RX IP 250 OP 636: Performed by: STUDENT IN AN ORGANIZED HEALTH CARE EDUCATION/TRAINING PROGRAM

## 2019-05-06 RX ORDER — AMOXICILLIN 250 MG
2 CAPSULE ORAL 2 TIMES DAILY PRN
Qty: 60 TABLET | Refills: 3 | Status: SHIPPED | OUTPATIENT
Start: 2019-05-06

## 2019-05-06 RX ORDER — POLYETHYLENE GLYCOL 3350 17 G/17G
17 POWDER, FOR SOLUTION ORAL 2 TIMES DAILY PRN
Qty: 10 PACKET | Refills: 3 | Status: SHIPPED | OUTPATIENT
Start: 2019-05-06

## 2019-05-06 RX ORDER — ACETAMINOPHEN 325 MG/1
325-650 TABLET ORAL EVERY 4 HOURS PRN
Qty: 100 TABLET | Refills: 0 | Status: SHIPPED | OUTPATIENT
Start: 2019-05-06

## 2019-05-06 RX ORDER — OXYCODONE HYDROCHLORIDE 5 MG/1
5 TABLET ORAL EVERY 6 HOURS PRN
Qty: 30 TABLET | Refills: 0 | Status: SHIPPED | OUTPATIENT
Start: 2019-05-06

## 2019-05-06 RX ADMIN — Medication 5 ML: at 08:45

## 2019-05-06 RX ADMIN — OXYCODONE HYDROCHLORIDE 5 MG: 5 TABLET ORAL at 00:00

## 2019-05-06 RX ADMIN — ACETAMINOPHEN 650 MG: 325 TABLET, FILM COATED ORAL at 04:05

## 2019-05-06 RX ADMIN — ACETAMINOPHEN 650 MG: 325 TABLET, FILM COATED ORAL at 00:00

## 2019-05-06 RX ADMIN — OXYCODONE HYDROCHLORIDE 5 MG: 5 TABLET ORAL at 04:06

## 2019-05-06 RX ADMIN — OXYCODONE HYDROCHLORIDE 5 MG: 5 TABLET ORAL at 11:16

## 2019-05-06 ASSESSMENT — ACTIVITIES OF DAILY LIVING (ADL)
ADLS_ACUITY_SCORE: 11

## 2019-05-06 NOTE — PLAN OF CARE
Discharge instruction given with verbalization of understanding. internal jugular removed- pressure dressing CDI. Oxy script being sent with patient and  to fill outside hospital. Patient safe to discharge home.

## 2019-05-06 NOTE — PLAN OF CARE
Status: POD#2 s/p Spinal Cord Untethering, Adhesion Lysis, Filum Lysis  Vitals: VSS on RA.   Neuros: Intact  IV: R triple lumen CVC SL.  Resp/trach: Lung sounds clear.   Diet: Reg diet. Appetite good.   Bowel status: No BM yet. Senna given.  : Giron catheter removed at 1200. Pt voiding well with PVR <30mL.  Skin: MD removed dressing & assessed incision at bedside. Incision is KATHY, WNL.  Pain: C/o back pain, controlled w/ PRN oxy. T-Pump in use.  Activity: PT cleared pt for independent in room.  Social: No visitors.  Plan: Possible discharge tomorrow.

## 2019-05-06 NOTE — PLAN OF CARE
Status: POD#3 s/p Spinal Cord Untethering, Adhesion Lysis, Filum Lysis  Vitals: VSS on RA.   Neuros: Intact  IV: R CVC SL  Resp/trach: LS clear   Diet: Reg diet. No reported nausea. Pt had peanut butter jelly sandwich overnight.  Bowel status: No BM yet. BS+, passing flatus.   : Voiding w/o difficulty. PVR 57 mL.   Skin: Back incision KATHY, WDL.  Pain: C/o back pain, controlled w/ PRN oxy & tylenol.   Activity: Up independently in room. Cleared by PT.   Social: No visitors overnight.  Plan: Possible discharge today.

## 2019-05-06 NOTE — PROGRESS NOTES
S: no headaches or leak from incision after mobilization.    O:  Exam:  General: Awake;  Alert, In No Acute Distress  Mental status: Oriented x 3  Cranial Nerves: Cranial Nerves II-XII Intact Bilaterally  Strength:      Del Tr Bi WE WF Gr  R 5 5 5 5 5 5  L 5 5 5 5 5 5     HF KE KF DF PF   R 5 5 5 5 5   L 5 5 5 5 5   Pronator Drift: Absent  Sensory: Intact to Light Touch  INCISION: clean, dry    Assessment:   28 year old female s/p Spinal Cord Untethering, Adhesion Lysis, Filum Lysis, doing well post-op.    Plan by problem:     Neuro:   Activity as tolerated   Sutures out: 5/17    Cardiovascular: blood pressure goals: SBP < 160    Pulmonary:   Incentive spirometry     Gastrointestinal: regular diet     Renal: off carmichael    Heme: no issues    Endocrine: No issues     Infectious Monitor for fever     PT/OT: home    DVT prophylaxis: Sequential compression devices     Ulcer prophylaxis: none indicated     DISPO:  Anticipate D/C 5/6 pending adequate bowel/bladder function    Barriers: none      Vikas Retana M.D.  Neurosurgery Resident, PGY-1  (P) [phone]    I saw and evaluated the patient and reviewed the imaging findings.  I agree with the assessment and plan as documented in the resident's note.

## 2019-05-06 NOTE — DISCHARGE SUMMARY
[facility] Discharge Summary and Instructions    Heaven Marx MRN# [id number]   Age: 28 year old YOB: 1990     Date of Admission:  5/3/2019  Date of Discharge::  5/6/2019  1:44 PM  Admitting Physician:  Salomón Renner MD  Discharge Physician:  Salomón Renner MD          Admission Diagnoses:   Tethered Cord  S/P spinal surgery          Discharge Diagnosis:   Tethered Cord  S/P spinal surgery          Procedures:   5/3/19:   1.  Redo untethering of spinal cord  2.  Intraoperative EMG monitoring  3.  Interpretation of radiograph for localization  4.  Microscope dissection           Brief History of Illness:   The patient is a 28-year-old female with diastematomyelia and tethered cord that was repaired in April 2015.  Unfortunately she has had recurrence of her symptoms.  Her left lower extremity is weak, coinciding with radiating pain to her left buttock that worsens with activity.  She continues to have baseline bowel and bladder problems.  There was concern for re-tethering after reviewing her MRI. Written consent was obtained for the above stated operation after careful discussion of the risks and benefits.            Hospital Course:   5/3/19: admitted to 6A post-operatively after uncomplicated operation.   5/4/19: remained on flat bedrest for intentional durotomy.   5/5/19: remained flat bedrest and liberalized activity at 12:00pm. Worked on ambulation. Carmichael discontinued.   5/6/19: found suitable for discharge as patient was ambulating at baseline, voiding without a carmichael, pain was controlled, and tolerating an oral diet.   Physical examination:   Awake and alert  Face symmetric   No dysarthria   5/5 in bilateral upper and lower extremities  Incision clean/dry/intact     Follow-up plan:   1) You should follow up with neurosurgery MARY in 2 weeks for suture removal, wound check, and general post-operative care. You should call [phone] or [phone] if you have  not heard from the hospital within 7 days of discharge regarding your follow-up appointment.   2) You should follow up with Dr. Renner in 4 -6 weeks         Discharge Medications:     Current Discharge Medication List      START taking these medications    Details   oxyCODONE (ROXICODONE) 5 MG tablet Take 1 tablet (5 mg) by mouth every 6 hours as needed for severe pain  Qty: 30 tablet, Refills: 0    Associated Diagnoses: S/P spinal surgery      polyethylene glycol (MIRALAX/GLYCOLAX) packet Take 17 g by mouth 2 times daily as needed for constipation  Qty: 10 packet, Refills: 3    Associated Diagnoses: S/P spinal surgery      senna-docusate (SENOKOT-S/PERICOLACE) 8.6-50 MG tablet Take 2 tablets by mouth 2 times daily as needed for constipation  Qty: 60 tablet, Refills: 3    Associated Diagnoses: S/P spinal surgery         CONTINUE these medications which have CHANGED    Details   acetaminophen (TYLENOL) 325 MG tablet Take 1-2 tablets (325-650 mg) by mouth every 4 hours as needed for mild pain  Qty: 100 tablet, Refills: 0    Associated Diagnoses: S/P spinal surgery         CONTINUE these medications which have NOT CHANGED    Details   buPROPion (WELLBUTRIN XL) 300 MG 24 hr tablet Take 150 mg by mouth At Bedtime       DULoxetine (CYMBALTA) 60 MG capsule TAKE 1 CAPSULE DAILY AT BEDTIME DO NOT CRUSH      HYDROcodone-acetaminophen (NORCO) 5-325 MG tablet Take 1 tablet by mouth as needed for pain       hydrOXYzine (ATARAX) 25 MG tablet Take 12.5-25 mg by mouth as needed       lidocaine (LIDODERM) 5 % patch Place 1 patch onto the skin as needed       Vitamin D2, Ergocalciferol, 2000 units CAPS Take 2,000 Units by mouth At Bedtime ON HOLD FOR SURGERY SINCE 04/18/2019                  Discharge Instructions and Follow-Up:   Discharge diet: Regular   Discharge activity:  Do not do any bending, twisting, strenuous exercise, or heavy lifting (greater than 10 pounds) for 4-6 weeks. Be careful and ask for assistance when walking  or going up and down stairs. Avoid any activities that could result in trauma to the surgical wound. Do not drive while using narcotics or other sedating medications, such as sleep aids, muscle relaxants, etc.    Discharge follow-up: 1) You should follow up with neurosurgery MARY in 2 weeks for suture removal, wound check, and general post-operative care. You should call [phone] or [phone] if you have not heard from the hospital within 7 days of discharge regarding your follow-up appointment.   2) You should follow up with Dr. Renner in 4 -6 weeks    Wound care: Ok to shower,however no scrubbing of the wound and no soaking of the wound, meaning no bathtubs or swimming pools. Pat dry only. Leave wound open to air.  Sutures are not absorbable and need to be removed in 2 weeks. If patient still at rehab by this time, the sutures may be removed by the rehab physician if he or she considers that the wound has healed completely.     Please call if you have:  1. increased pain, redness, drainage, swelling at your incision  2. fevers > 101.5 F degrees  3. with any questions or concerns.  You may reach the Neurosurgery clinic at [phone] during regular work hours. ER at [phone].    and ask for the Neurosurgery Resident on call at [phone], during off hours or weekends.         Discharge Disposition:   Discharged to home

## 2019-05-07 ENCOUNTER — PATIENT OUTREACH (OUTPATIENT)
Dept: CARE COORDINATION | Facility: CLINIC | Age: 29
End: 2019-05-07

## 2019-05-29 DIAGNOSIS — Q06.8 TETHERED CORD (H): Primary | ICD-10-CM

## 2019-05-29 RX ORDER — CYCLOBENZAPRINE HCL 5 MG
5 TABLET ORAL 3 TIMES DAILY PRN
Qty: 25 TABLET | Refills: 0 | Status: SHIPPED | OUTPATIENT
Start: 2019-05-29

## 2019-07-30 ENCOUNTER — OFFICE VISIT (OUTPATIENT)
Dept: NEUROSURGERY | Facility: CLINIC | Age: 29
End: 2019-07-30
Attending: NEUROLOGICAL SURGERY
Payer: COMMERCIAL

## 2019-07-30 ENCOUNTER — HOSPITAL ENCOUNTER (OUTPATIENT)
Dept: MRI IMAGING | Facility: CLINIC | Age: 29
Discharge: HOME | End: 2019-07-30
Attending: NURSE PRACTITIONER | Admitting: NURSE PRACTITIONER
Payer: COMMERCIAL

## 2019-07-30 VITALS
RESPIRATION RATE: 20 BRPM | HEART RATE: 92 BPM | TEMPERATURE: 98.2 F | BODY MASS INDEX: 39.92 KG/M2 | SYSTOLIC BLOOD PRESSURE: 124 MMHG | HEIGHT: 63 IN | WEIGHT: 225.31 LBS | DIASTOLIC BLOOD PRESSURE: 84 MMHG

## 2019-07-30 DIAGNOSIS — Q06.2 DIASTEMATOMYELIA (H): ICD-10-CM

## 2019-07-30 DIAGNOSIS — Q06.8 TETHERED CORD (H): Primary | ICD-10-CM

## 2019-07-30 DIAGNOSIS — Q06.8 TETHERED CORD (H): ICD-10-CM

## 2019-07-30 PROCEDURE — 72148 MRI LUMBAR SPINE W/O DYE: CPT

## 2019-07-30 PROCEDURE — G0463 HOSPITAL OUTPT CLINIC VISIT: HCPCS | Mod: 25,ZF

## 2019-07-30 ASSESSMENT — MIFFLIN-ST. JEOR: SCORE: 1716

## 2019-07-30 ASSESSMENT — PAIN SCALES - GENERAL: PAINLEVEL: MODERATE PAIN (5)

## 2019-07-30 NOTE — LETTER
7/30/2019      RE: Heaven Marx  [address] [place] [address]       Service Date: 07/30/2019      HISTORY OF PRESENT ILLNESS:  Ms. 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 Ghotra.  She returns to clinic for a 3-month followup.      Ms. Marx continues to have a significant pain in her left posterior buttock.  The pain is marginally improved following surgery.  She has chronic bowel and bladder symptoms that have not changed for many years.  She denies any new weakness or numbness or tingling in her lower extremities.      PHYSICAL EXAMINATION:  This is an obese, middle-aged female sitting in the exam chair in no acute distress.   Strength is 5/5 and symmetric in the upper and lower extremities.     Reflexes are 2/4 throughout.   Sensation is intact to light touch throughout.   Incision, well-healed midline lumbar incision, posterior lumbar incision with no evidence of drainage or erythema.      IMAGING:  MRI of the lumbar spine acquired on 07/03/2019 demonstrates persistent low-lying conus at the level of L3-L4 with no imaging evidence of ongoing spinal cord tethering.        ASSESSMENT:  A 28-year-old female status post redo spinal cord untethering.  She has done well in the postoperative period; however, she has not had any improvement in pain, which was a predominant motivation for the above operation.  Given that the spinal cord has been untethered to the extent that it is safely possible with surgical intervention, we discussed other treatment modalities for ongoing pain.      Plan:    1. Referral to Pain Clinic for a trial of spinal cord stimulator and if successful permanent implantation of spinal cord stimulator.    2. Follow up in Neurosurgery Clinic on an as-needed basis.         RICHA GHOTRA MD       As dictated by OSMANY FUNES  MD ARTEMIO, PHD, PGY4 neurosurgery resident.               D: 2019   T: 2019   MT: LALY      Name:     HEAVEN MENG   MRN:      [id number]        Account:      [id number]   :      1990           Service Date: 2019      Document: [id number]      I, Salomón Renner MD, saw and evaluated Heaven Meng as part of a shared visit.  I have reviewed and discussed with the resident their history, physical and plan.    I personally reviewed the vital signs, medications, labs and imaging .    My key history or physical exam findings: she has had some improvement in her hip pain, by about 25% but still has radicular type pain that is limiting. MRI shows no concerns.     Key management decisions made by me: We discussed that we do not feel we can do a better job with untethering and discussed other options including pain clinic evaluation and possible trial for spinal cord stimulation. She is willing to try this. Will make referral.     Salomón Renner MD  2019

## 2019-07-30 NOTE — PATIENT INSTRUCTIONS
Pediatric Neurosurgery at the [facility]  Our contact information    Mailing Address  [address] [address]    Street Address   [address] [place] [id number]    Main Phone Line   [phone]     RN Care Coordinator  [phone]     Nurse Practitioners   [phone]    Contact Numbers for Urgent Matters   [phone] and ask for pediatric neurosurgery  [phone] and ask for adult neurosurgery

## 2019-07-30 NOTE — NURSING NOTE
"Chief Complaint   Patient presents with     RECHECK     Tethered cord.     Vitals:    07/30/19 1328   BP: 124/84   BP Location: Right arm   Patient Position: Chair   Pulse: 92   Resp: 20   Temp: 98.2  F (36.8  C)   TempSrc: Oral   Weight: 225 lb 5 oz (102.2 kg)   Height: 5' 2.99\" (160 cm)      Molly Li M.A.  July 30, 2019  "

## 2019-07-31 NOTE — PROGRESS NOTES
Service Date: 07/30/2019      HISTORY OF PRESENT ILLNESS:  Ms. 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 Ghotra.  She returns to clinic for a 3-month followup.      Ms. Marx continues to have a significant pain in her left posterior buttock.  The pain is marginally improved following surgery.  She has chronic bowel and bladder symptoms that have not changed for many years.  She denies any new weakness or numbness or tingling in her lower extremities.      PHYSICAL EXAMINATION:  This is an obese, middle-aged female sitting in the exam chair in no acute distress.   Strength is 5/5 and symmetric in the upper and lower extremities.     Reflexes are 2/4 throughout.   Sensation is intact to light touch throughout.   Incision, well-healed midline lumbar incision, posterior lumbar incision with no evidence of drainage or erythema.      IMAGING:  MRI of the lumbar spine acquired on 07/03/2019 demonstrates persistent low-lying conus at the level of L3-L4 with no imaging evidence of ongoing spinal cord tethering.        ASSESSMENT:  A 28-year-old female status post redo spinal cord untethering.  She has done well in the postoperative period; however, she has not had any improvement in pain, which was a predominant motivation for the above operation.  Given that the spinal cord has been untethered to the extent that it is safely possible with surgical intervention, we discussed other treatment modalities for ongoing pain.      Plan:    1. Referral to Pain Clinic for a trial of spinal cord stimulator and if successful permanent implantation of spinal cord stimulator.    2. Follow up in Neurosurgery Clinic on an as-needed basis.         RICHA GHOTRA MD       As dictated by OSMANY ULLOA MD, PHD, PGY4 neurosurgery resident.               D: 07/30/2019   T:  2019   MT: LALY      Name:     KYLIE MENG   MRN:      [id number]        Account:      [id number]   :      1990           Service Date: 2019      Document: [id number]      I, Salomón Renner MD, saw and evaluated Kylie Meng as part of a shared visit.  I have reviewed and discussed with the resident their history, physical and plan.    I personally reviewed the vital signs, medications, labs and imaging .    My key history or physical exam findings: she has had some improvement in her hip pain, by about 25% but still has radicular type pain that is limiting. MRI shows no concerns.     Key management decisions made by me: We discussed that we do not feel we can do a better job with untethering and discussed other options including pain clinic evaluation and possible trial for spinal cord stimulation. She is willing to try this. Will make referral.     Salomón Renner MD  2019

## 2019-08-06 ENCOUNTER — CARE COORDINATION (OUTPATIENT)
Dept: NEUROSURGERY | Facility: CLINIC | Age: 29
End: 2019-08-06

## 2019-08-06 DIAGNOSIS — Q06.2 DIASTEMATOMYELIA (H): Primary | ICD-10-CM

## 2020-03-10 ENCOUNTER — HEALTH MAINTENANCE LETTER (OUTPATIENT)
Age: 30
End: 2020-03-10

## 2020-12-20 ENCOUNTER — HEALTH MAINTENANCE LETTER (OUTPATIENT)
Age: 30
End: 2020-12-20

## 2021-04-24 ENCOUNTER — HEALTH MAINTENANCE LETTER (OUTPATIENT)
Age: 31
End: 2021-04-24

## 2021-10-03 ENCOUNTER — HEALTH MAINTENANCE LETTER (OUTPATIENT)
Age: 31
End: 2021-10-03

## 2022-05-15 ENCOUNTER — HEALTH MAINTENANCE LETTER (OUTPATIENT)
Age: 32
End: 2022-05-15

## 2022-09-11 ENCOUNTER — HEALTH MAINTENANCE LETTER (OUTPATIENT)
Age: 32
End: 2022-09-11

## 2023-06-03 ENCOUNTER — HEALTH MAINTENANCE LETTER (OUTPATIENT)
Age: 33
End: 2023-06-03

## 2025-03-24 NOTE — TELEPHONE ENCOUNTER
Action 2025  11:18 AM MMT   Action Taken  Request faxed to [facility] for images to be pushed to PACS>    Update 2025 3:28 PM MMT :  Images received from [facility] and resolved to PACS.         MEDICAL RECORDS REQUEST   [facility]  Urology Clinic  [address] [place] [address]  PHONE: [phone]  Fax: [phone]        FUTURE VISIT INFORMATION                                                   Heaven Marx, : 1990 scheduled for future visit at [facility]    APPOINTMENT INFORMATION:  Date: 3/27/25  Provider:  Katlin Rouse NP   Reason for Visit/Diagnosis: Per Pt, Neurogenic bladders, location verified, self referred, recs with [facility].      REFERRAL INFORMATION:Self Referred     RECORDS REQUESTED FOR VISIT                                                     NOTES  STATUS/DETAILS   OFFICE NOTE from other specialist  yes  3/16/25 - Leon Lino PA-C - [facility]    DISCHARGE REPORT from the ER  yes  3/16/25 - [facility] ED Jc Forrest II, MD     11/3/15, 10/21/15 -- Karly López MD - [facility] PM&R Urodynamic study    MEDICATION LIST  yes   LABS     URINALYSIS (UA)  yes 3/16/25   NEUROGENIC BLADDER     CT ABDOMEN/PELVIS (IMAGES & REPORT)  yes  [facility]:   3/17/25, 12/22/15, 14   RENAL/BLADDER ULTRASOUND (IMAGES & REPORT)  yes  [facility]:   6/15/17 US Renal Complete    LABS (CMP, RENAL PANEL, CBC)  yes  24 - [facility]      PRE-VISIT CHECKLIST      Joint diagnostic appointment coordinated correctly          (ensure right order & amount of time) Yes   RECORD COLLECTION COMPLETE Yes

## 2025-03-25 ENCOUNTER — PRE VISIT (OUTPATIENT)
Dept: UROLOGY | Facility: CLINIC | Age: 35
End: 2025-03-25
Payer: COMMERCIAL

## 2025-03-25 NOTE — TELEPHONE ENCOUNTER
Reason for visit: Consult     Relevant information: NGB    Records/imaging/labs/orders: Available in Epic    Pt called: No need for a call    At Rooming: Standard    Tutu Duncan, EMT-P  3/25/2025  8:31 AM

## 2025-03-27 ENCOUNTER — PRE VISIT (OUTPATIENT)
Dept: UROLOGY | Facility: CLINIC | Age: 35
End: 2025-03-27

## 2025-03-31 ENCOUNTER — PRE VISIT (OUTPATIENT)
Dept: UROLOGY | Facility: CLINIC | Age: 35
End: 2025-03-31
Payer: COMMERCIAL

## 2025-03-31 NOTE — TELEPHONE ENCOUNTER
Reason for visit: VUDS    Study scheduled because: neurogenic bladder    Study requested by: Katlin Rouse DNP    Records/imaging/labs/orders: available in Epic    UA/UC needed: TBC    Other notes: N/A    --  Jorge Luis Pereira on 3/31/2025 at 2:49 PM

## 2025-04-05 ENCOUNTER — HEALTH MAINTENANCE LETTER (OUTPATIENT)
Age: 35
End: 2025-04-05

## 2025-04-05 NOTE — PROGRESS NOTES
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  Slow flow   Urinary hesitancy  History of stress incontinence  History of UTIs    POSTPROCEDURE DIAGNOSES:    -Normal bladder capacity (442 mL).  -Early filling sensations though patient reported urethral discomfort after catheter placement.  -Good bladder compliance   -No DO/DOI. No reproducible SELAM with cough or Valsalva.   -Detrusor contraction during voiding to max Pdet = 40 cmH2O which patient supplements with significant Valsalva effort. Patient feels she must \"hold pressure\" in her bladder in order to maintain a urinary stream.   -Slow flow rate (Qmax 15 mL/s) with fluctuating, low-flow per pre-study uroflow  -Complete bladder emptying (final PVR 0 mL).  -Spikes of EMG activity concordant with Valsalva voiding.  -BOOI is -7.5 which suggests no bladder outlet obstruction.  -Fluoroscopy reveals a smooth to mildly-trabeculated bladder wall without diverticulae or cellules.  No VUR observed.  Bladder neck was closed during filling and and not observed during voiding as patient was unable to void volitionally on UDS chair.     PROCEDURE:    -Uroflowmetry.  -Sterile urethral catheterization for measurement of postvoid residual urine volume.  -Complex filling cystometrogram with measurement of bladder and vaginal pressures.  -Complex voiding cystometrogram with measurement of bladder and vaginal pressures.  -Electromyography of the pelvic floor during urodynamics.  -Fluoroscopic imaging of the bladder during urodynamics, at least 3 views.    -Interpretation of urodynamics and flouroscopic imaging.      INDICATIONS FOR PROCEDURE:  Ms. Heaven Marx is a pleasant 34 year old female who presents for video urodynamic assessment. VUDS is requested today by Katlin Rouse CNP to better characterize Ms. Heaven Marx's voiding dysfunction. The patient is not currently taking an anticholinergic or beta 3 agonist medication. "      VOIDING DIARY: Not completed in preparation for today's visit.     DESCRIPTION OF PROCEDURE:  Risks, benefits, and alternatives to urodynamics were discussed with the patient and she wished to proceed. After informed consent was obtained, the patient was taken to the procedure room where the study was initiated. Findings below.     PRE-STUDY UROFLOWMETRY:  Voided volume: 460 mL.  Maximum flow rate: 15 mL/sec.  Average flow rate: 6.9 mL/sec.  Character of the curve: Fluctuating, low flow.  Postvoid residual by catheter: ~50 mL.    First, uroflow was performed followed by sterile urethral catheterization for measurement of postvoid residual urine volume (50 mL). Next a 7F double-lumen urodynamics catheter was inserted into the bladder under sterile technique via the urethra.  A 7F abdominal manometry catheter was placed in the rectum.  EMG pads were placed on both sides of the anal verge. With coughing there was an appropriate rise in vesical and abdominal pressures with no change in detrusor pressure, confirming good study catheter placement. The bladder was then filled with 100 mL of Omnipaque at 30 mL/minute and serial pressures were recorded. After the Omnipaque finished infusing, we continued to fill the bladder with normal saline until patient reached cystometric capacity. At the completion of the study, all catheters were removed and the patient was brought back into the consultation room to review today's study results.      DURING THE FILLING PHASE:  Please note, volumes listed below are 30 mL less than those listed on urodynamics report as to account for contrast differential.    First sensation: 66 mL (15% capacity)  First Desire: 219 mL (51% capacity)  Strong Desire: 261 mL (61% capacity)   Maximum Capacity: 426 mL     Uninhibited detrusor contractions: None .  Compliance: Good. Pdet = ~0 cmH20 at capacity.  Continence: No leaking throughout study. No leak with cough x2 or Valsalva x1.  EMG: Quiet  "during filling.    DURING THE VOIDING PHASE:  Patient was unable to void volitionally on the urodynamics chair despite Valsalva effort. She subsequently transferred to the [facility] where she strained to initiate a stream. With voiding, patient appears to produce a detrusor contraction to peak Pdet 40 cmH2O which she supplements with significant Valsalva effort. Patient feels she must \"hold pressure\" in her bladder in order to maintain a urinary stream. Says that if she stops holding pressure, her stream will stop.    Maximum detrusor contraction with void: 44 cmH2O.  Voided volume: 442 mL.  Maximum flow rate: 13 mL/sec.  Average flow rate: 4.8 mL/sec.  Postvoid Residual: 0 mL.  EMG activity: Spikes of activity concordant with Valsalva voiding. High activity after voiding phase reflects dislodgement of EMG patches.  Character of voiding curve: Fluctuating and intermittent.  BOOI: -7.5, suggesting no obstruction.  [obstructed (CHAPIN index [BOOI] >= 40); equivocal (no definite obstruction; BOOI 20-40); and no obstruction (BOOI <= 20)]    FLUOROSCOPIC IMAGING OF THE BLADDER DURING URODYNAMICS:  Please note, image numbers on UDS tracings correlate with iSite series numbers on PACS images. Fluoroscopy during today's procedure demonstrated a smooth to mildly-trabeculated bladder wall without diverticulae or cellules.  No vesicoureteral reflux was observed.  The bladder neck was closed during filling and and not observed during voiding as patient was unable to void volitionally on the urodynamics chair.     ASSESSMENT/PLAN:  Ms. Heaven Marx is a pleasant 34 year old female who demonstrated the following findings today on urodynamic evaluation:    -Normal bladder capacity (442 mL).  -Early filling sensations though patient reported urethral discomfort after catheter placement.  -Good bladder compliance   -No DO/DOI. No reproducible SELAM with cough or Valsalva.   -Detrusor contraction during voiding to max Pdet = 40 cmH2O " "which patient supplements with significant Valsalva effort. Patient feels she must \"hold pressure\" in her bladder in order to maintain a urinary stream.   -Slow flow rate (Qmax 15 mL/s) with fluctuating, low-flow per pre-study uroflow  -Complete bladder emptying (final PVR 0 mL).  -Spikes of EMG activity concordant with Valsalva voiding.  -BOOI is -7.5 which suggests no bladder outlet obstruction.  -Fluoroscopy reveals a smooth to mildly-trabeculated bladder wall without diverticulae or cellules.  No VUR observed.  Bladder neck was closed during filling and and not observed during voiding as patient was unable to void volitionally on UDS chair.     The patient will follow up as scheduled with Katlin Rouse CNP on 4/10/25 to further discuss today's study results and make plans for how best to proceed.      - A single Keflex was provided for UTI prophylaxis following completion of today's study per department protocol.  The risk of UTI with VUDS is low at ~2.5-3%.      Thank you for allowing me to participate in the care of Ms. Heaven Marx and please don't hesitate to contact me with any questions or concerns.      Carolee Hoskins PA-C  Department of Urology     "

## 2025-04-08 NOTE — PROGRESS NOTES
"Video-Visit Details    Type of service:  Video Visit   Video Start Time: 1344  Video End Time: 1355    Originating Location (pt. 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        Medsurg History     Early childhood-intermittent stress urinary incontinence, urinary retention     4/9/2015 - L2 and L3 laminectomy, repair of diastematomyelia and tethered cord release      ER 3/17/25 for pain to CIC.  She feel that she is passing gas from her urethra.  CT has no abnormal findings.       3/27/25 - visit with me, She voids spontaneously + CIC or postvoid CIC with 12 [place] at bedtime only.  If she does not CIC at bedtime, she could get leg spasms.  UTI x1/year. Difficulty with CIC sometimes.    4/9/25 - UDS  -Normal bladder capacity (442 mL).  -Early filling sensations though patient reported urethral discomfort after catheter placement.  -Good bladder compliance   -No DO/DOI. No reproducible SELAM with cough or Valsalva.   -Detrusor contraction during voiding to max Pdet = 40 cmH2O which patient supplements with significant Valsalva effort. Patient feels she must \"hold pressure\" in her bladder in order to maintain a urinary stream.   -Slow flow rate (Qmax 15 mL/s) with fluctuating, low-flow per pre-study uroflow  -Complete bladder emptying (final PVR 0 mL).  -Spikes of EMG activity concordant with Valsalva voiding.  -BOOI is -7.5 which suggests no bladder outlet obstruction.  -Fluoroscopy reveals a smooth to mildly-trabeculated bladder wall without diverticulae or cellules.  No VUR observed.  Bladder neck was closed during filling and and not observed during voiding as patient was unable to void volitionally on UDS chair.      Today  -Patient tried another catheter brand for CIC, continues to have occasional pain and " difficult with CIC.        Exam:  There were no vitals taken for this visit.  GENERAL: alert and no distress  EYES: Eyes grossly normal to inspection.  No discharge or erythema, or obvious scleral/conjunctival abnormalities.  RESP: No audible wheeze, cough, or visible cyanosis.    SKIN: Visible skin clear. No significant rash, abnormal pigmentation or lesions.  NEURO: Cranial nerves grossly intact.  Mentation and speech appropriate for age.  PSYCH: Appropriate affect, tone, and pace of words    Review of Imaging:  The following imaging exams were independently viewed and interpreted by me and discussed with patient:  No reflux on x-ray during UDS    Review of Labs:  The following labs were reviewed by me and discussed with the patient:  No results found for this or any previous visit (from the past 720 hours).      Assessment & Plan   NGB 2/2 L2-L3 SCI from tethered cord and laminectomy  Pain and difficulty with CIC    - Discussed that UDS showed possible DSD given high detrusor pressure and high EMG during voiding phase.  This could explain her difficulty urination and pain and difficulty with CIC.  - Okay to continue with CIC at bedtime for her leg spasms.  - Okay to continue Valsalva voiding.  - Trial of Flomax.  - Schedule sphincter Botox with Dr. Bridges or Dr. Marie at the next available.        An Rouse NP  [facility] [place]    ==========================      Additional Coding Information:    Time spent:  I spent a total of 20 minutes on the day of the visit.   Time spent by me today doing chart review, history and exam, documentation and further activities per the note

## 2025-04-09 ENCOUNTER — ANCILLARY PROCEDURE (OUTPATIENT)
Dept: RADIOLOGY | Facility: AMBULATORY SURGERY CENTER | Age: 35
End: 2025-04-09
Payer: COMMERCIAL

## 2025-04-09 ENCOUNTER — ALLIED HEALTH/NURSE VISIT (OUTPATIENT)
Dept: UROLOGY | Facility: CLINIC | Age: 35
End: 2025-04-09
Payer: COMMERCIAL

## 2025-04-09 VITALS — DIASTOLIC BLOOD PRESSURE: 79 MMHG | SYSTOLIC BLOOD PRESSURE: 130 MMHG | OXYGEN SATURATION: 97 % | HEART RATE: 99 BPM

## 2025-04-09 DIAGNOSIS — N31.9 NEUROGENIC BLADDER: Primary | ICD-10-CM

## 2025-04-09 DIAGNOSIS — R39.198 SLOWING OF URINARY STREAM: ICD-10-CM

## 2025-04-09 DIAGNOSIS — R39.89 URINARY PROBLEM: ICD-10-CM

## 2025-04-09 DIAGNOSIS — Z79.2 PROPHYLACTIC ANTIBIOTIC: ICD-10-CM

## 2025-04-09 DIAGNOSIS — R39.11 URINARY HESITANCY: ICD-10-CM

## 2025-04-09 PROCEDURE — 1125F AMNT PAIN NOTED PAIN PRSNT: CPT

## 2025-04-09 PROCEDURE — 51741 ELECTRO-UROFLOWMETRY FIRST: CPT

## 2025-04-09 PROCEDURE — 74430 CONTRAST X-RAY BLADDER: CPT

## 2025-04-09 PROCEDURE — 3078F DIAST BP <80 MM HG: CPT

## 2025-04-09 PROCEDURE — 51600 INJECTION FOR BLADDER X-RAY: CPT

## 2025-04-09 PROCEDURE — 3075F SYST BP GE 130 - 139MM HG: CPT

## 2025-04-09 PROCEDURE — 51784 ANAL/URINARY MUSCLE STUDY: CPT

## 2025-04-09 PROCEDURE — 51726 COMPLEX CYSTOMETROGRAM: CPT

## 2025-04-09 RX ORDER — CEPHALEXIN 500 MG/1
500 CAPSULE ORAL ONCE
Status: COMPLETED | OUTPATIENT
Start: 2025-04-09 | End: 2025-04-09

## 2025-04-09 RX ADMIN — CEPHALEXIN 500 MG: 500 CAPSULE ORAL at 08:32

## 2025-04-09 ASSESSMENT — PAIN SCALES - GENERAL: PAINLEVEL_OUTOF10: MODERATE PAIN (4)

## 2025-04-09 NOTE — NURSING NOTE
.  Chief Complaint   Patient presents with    Urodynamics Study       Blood pressure 130/79, pulse 99, SpO2 97%, not currently breastfeeding. There is no height or weight on file to calculate BMI.    Patient Active Problem List   Diagnosis    Tethered cord (H)    Diastematomyelia (H)    Adhesive arachnoiditis    ASCUS on Pap smear    BMI 38.0-38.9,adult    Generalized anxiety disorder    History of tethered spinal cord    Left-sided low back pain with left-sided sciatica    Major depression, recurrent    Major depressive disorder, recurrent episode, moderate (H)    Mononeuritis    Neurogenic bladder    Rash and other nonspecific skin eruption    Restless leg syndrome    Retention of urine    S/P laparoscopic hysterectomy    Temporomandibular joint disorder    Vitamin D deficiency    S/P spinal surgery       Allergies   Allergen Reactions    Gabapentin Hives    Sulfa Antibiotics Hives       Current Outpatient Medications   Medication Sig Dispense Refill    acetaminophen (TYLENOL) 325 MG tablet Take 1-2 tablets (325-650 mg) by mouth every 4 hours as needed for mild pain 100 tablet 0    amphetamine-dextroamphetamine (ADDERALL) 15 MG tablet Take 15 mg by mouth 2 times daily.      buPROPion (WELLBUTRIN XL) 300 MG 24 hr tablet Take 150 mg by mouth At Bedtime       DULoxetine (CYMBALTA) 60 MG capsule TAKE 1 CAPSULE DAILY AT BEDTIME DO NOT CRUSH      omeprazole (PRILOSEC) 40 MG DR capsule Take 40 mg by mouth daily.      Vitamin D2, Ergocalciferol, 2000 units CAPS Take 2,000 Units by mouth At Bedtime ON HOLD FOR SURGERY SINCE 04/18/2019      cyclobenzaprine (FLEXERIL) 5 MG tablet Take 1 tablet (5 mg) by mouth 3 times daily as needed for muscle spasms (Patient not taking: Reported on 3/27/2025) 25 tablet 0    HYDROcodone-acetaminophen (NORCO) 5-325 MG tablet Take 1 tablet by mouth as needed for pain  (Patient not taking: Reported on 3/27/2025)      hydrOXYzine (ATARAX) 25 MG tablet Take 12.5-25 mg by mouth as needed  (Patient  not taking: Reported on 3/27/2025)      lidocaine (LIDODERM) 5 % patch Place 1 patch onto the skin as needed  (Patient not taking: Reported on 3/27/2025)      oxyCODONE (ROXICODONE) 5 MG tablet Take 1 tablet (5 mg) by mouth every 6 hours as needed for severe pain (Patient not taking: Reported on 3/27/2025) 30 tablet 0    polyethylene glycol (MIRALAX/GLYCOLAX) packet Take 17 g by mouth 2 times daily as needed for constipation (Patient not taking: Reported on 3/27/2025) 10 packet 3    senna-docusate (SENOKOT-S/PERICOLACE) 8.6-50 MG tablet Take 2 tablets by mouth 2 times daily as needed for constipation (Patient not taking: Reported on 3/27/2025) 60 tablet 3       Social History     Tobacco Use    Smoking status: Never    Smokeless tobacco: Never   Substance Use Topics    Alcohol use: No     Comment: rare    Drug use: No     Types: Other     Comment: Drug use: No       Invasive Procedure Safety Checklist:    Procedure: Urodynamics    Action: Complete sections and checkboxes as appropriate.  Pre-procedure:  1. Patient ID Verified with 2 identifiers (Manisha and  or MRN) : YES    2. Procedure and site verified with patient/designee (when able) : YES    3. Accurate consent documentation in medical record : YES    4. H&P (or appropriate assessment) documented in medical record : N/A  H&P must be up to 30 days prior to procedure an updated within 24 hours of Procedure as applicable.     5. Relevant diagnostic and radiology test results appropriately labeled and displayed as applicable : YES    6. Blood products, implants, devices, and/or special equipment available for the procedure as applicable : YES    7. Procedure site(s) marked with provider initials [Exclusions: none] : NO    8. Marking not required. Reason : Yes  Procedure does not require site marking    Time Out:     Time-Out performed immediately prior to starting procedure, including verbal and active participation of all team members addressing: YES    1.  Correct patient identity.  2. Confirmed that the correct side and site are marked.  3. An accurate procedure to be done.  4. Agreement on the procedure to be done.  5. Correct patient position.  6. Relevant images and results are properly labeled and appropriately displayed.  7. The need to administer antibiotics or fluids for irrigation purposes during the procedure as applicable.  8. Safety precautions based on patient history or medication use.    During Procedure: Verification of correct person, site, and procedure occurs any time the responsibility for care of the patient is transferred to another member of the care team.      The following medication was given:     MEDICATION: Keflex (Cefalexin)  ROUTE: PO  SITE: Medication was given orally  DOSE: 500mg  LOT #: [id number]  : [employer]  EXPIRATION DATE: 2026-NOV-30  NDC#: [id number]   Was there drug waste? No    Prior to medication administration, verified patient identity using patient's name and date of birth.  Due to medication administration, patient instructed to remain in clinic for 15 minutes  afterwards, and to report any adverse reaction to me immediately.   Prior to administration, verified patient identity using patient's name and date of birth.  Due to administration, patient instructed to remain in clinic for 15 minutes  afterwards, and to report any adverse reaction to me immediately.    Drug Amount Wasted:  None.  Vial/Syringe: Single dose vial    The following medication was given:     MEDICATION:  Omnipaque (Iohexol Injection) (240mgI/mL)  ROUTE: Provider Administered  SITE: Provider Administered via catheter  DOSE: 100mL  LOT #: [id number]  : [employer]  EXPIRATION DATE: 10 APR 2027  NDC#: 1665-3773-25   Was there drug waste? No    Prior to injection, verified patient identity using patient's name and date of birth.  Due to injection administration, patient instructed to remain in clinic for 15 minutes   afterwards, and to report any adverse reaction to me immediately.    Drug Amount Wasted:  None.  Vial/Syringe: Single dose vial      The following medication was given: See Above    Patient did tolerate procedure well.    Patient instructed as to where to call or go for pain, fever, leakage, or decreased urine flow.     This service provided today was under the direct supervision of Carolee Hoskins PA-C, who was available if needed.    Angela Almonte  4/9/2025  7:14 AM

## 2025-04-10 ENCOUNTER — VIRTUAL VISIT (OUTPATIENT)
Dept: UROLOGY | Facility: CLINIC | Age: 35
End: 2025-04-10
Payer: COMMERCIAL

## 2025-04-10 DIAGNOSIS — N31.9 NEUROGENIC BLADDER: Primary | ICD-10-CM

## 2025-04-10 RX ORDER — CHOLECALCIFEROL (VITAMIN D3) 50 MCG
1 TABLET ORAL DAILY
COMMUNITY

## 2025-04-10 RX ORDER — LORAZEPAM 0.5 MG/1
0.5 TABLET ORAL
COMMUNITY
Start: 2025-03-04

## 2025-04-10 RX ORDER — TAMSULOSIN HYDROCHLORIDE 0.4 MG/1
0.4 CAPSULE ORAL DAILY
Qty: 30 CAPSULE | Refills: 3 | Status: SHIPPED | OUTPATIENT
Start: 2025-04-10

## 2025-04-10 RX ORDER — TRAZODONE HYDROCHLORIDE 50 MG/1
25-100 TABLET ORAL
COMMUNITY
Start: 2025-02-24

## 2025-04-10 ASSESSMENT — PAIN SCALES - GENERAL: PAINLEVEL_OUTOF10: MODERATE PAIN (4)

## 2025-04-10 NOTE — NURSING NOTE
Current patient location: [address] [place] [address]    Is the patient currently in the state of [place]? YES    Visit mode: TELEPHONE    If the visit is dropped, the patient can be reconnected by:VIDEO VISIT: Text to cell phone:   Telephone Information:   Mobile [phone]       Will anyone else be joining the visit? NO  (If patient encounters technical issues they should call [phone]    Are changes needed to the allergy or medication list? No    Are refills needed on medications prescribed by this physician? NO    Rooming Documentation:  Patient declined to complete questionnaire(s)    Pt reports she has been seeing a provider for depression since she was a teenager, no need for further referral/questionnaire today.    Reason for visit: RADHAECK    Yoli VILLAGOMEZ

## 2025-04-10 NOTE — LETTER
"4/10/2025       RE: Heaven Marx  [address] [place] [address]     Dear Colleague,    Thank you for referring your patient, Heavne Marx, to the [facility] [place] at [facility]. Please see a copy of my visit note below.    Video-Visit Details    Type of service:  Video Visit   Video Start Time: 1344  Video End Time: 1355    Originating Location (pt. 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        Medsurg History     Early childhood-intermittent stress urinary incontinence, urinary retention     4/9/2015 - L2 and L3 laminectomy, repair of diastematomyelia and tethered cord release      ER 3/17/25 for pain to CIC.  She feel that she is passing gas from her urethra.  CT has no abnormal findings.       3/27/25 - visit with me, She voids spontaneously + CIC or postvoid CIC with 12 Upper sorbian at bedtime only.  If she does not CIC at bedtime, she could get leg spasms.  UTI x1/year. Difficulty with CIC sometimes.    4/9/25 - UDS  -Normal bladder capacity (442 mL).  -Early filling sensations though patient reported urethral discomfort after catheter placement.  -Good bladder compliance   -No DO/DOI. No reproducible SELAM with cough or Valsalva.   -Detrusor contraction during voiding to max Pdet = 40 cmH2O which patient supplements with significant Valsalva effort. Patient feels she must \"hold pressure\" in her bladder in order to maintain a urinary stream.   -Slow flow rate (Qmax 15 mL/s) with fluctuating, low-flow per pre-study uroflow  -Complete bladder emptying (final PVR 0 mL).  -Spikes of EMG activity concordant with Valsalva voiding.  -BOOI is -7.5 which suggests no bladder outlet obstruction.  -Fluoroscopy reveals a smooth to " mildly-trabeculated bladder wall without diverticulae or cellules.  No VUR observed.  Bladder neck was closed during filling and and not observed during voiding as patient was unable to void volitionally on UDS chair.      Today  -Patient tried another catheter brand for CIC, continues to have occasional pain and difficult with CIC.        Exam:  There were no vitals taken for this visit.  GENERAL: alert and no distress  EYES: Eyes grossly normal to inspection.  No discharge or erythema, or obvious scleral/conjunctival abnormalities.  RESP: No audible wheeze, cough, or visible cyanosis.    SKIN: Visible skin clear. No significant rash, abnormal pigmentation or lesions.  NEURO: Cranial nerves grossly intact.  Mentation and speech appropriate for age.  PSYCH: Appropriate affect, tone, and pace of words    Review of Imaging:  The following imaging exams were independently viewed and interpreted by me and discussed with patient:  No reflux on x-ray during UDS    Review of Labs:  The following labs were reviewed by me and discussed with the patient:  No results found for this or any previous visit (from the past 720 hours).      Assessment & Plan  NGB 2/2 L2-L3 SCI from tethered cord and laminectomy  Pain and difficulty with CIC    - Discussed that UDS showed possible DSD given high detrusor pressure and high EMG during voiding phase.  This could explain her difficulty urination and pain and difficulty with CIC.  - Okay to continue with CIC at bedtime for her leg spasms.  - Okay to continue Valsalva voiding.  - Trial of Flomax.  - Schedule sphincter Botox with Dr. Bridges or Dr. Marie at the next available.        An Rouse NP  [facility] [place]    ==========================      Additional Coding Information:    Time spent:  I spent a total of 20 minutes on the day of the visit.   Time spent by me today doing chart review, history and exam, documentation and further activities per  the note              Again, thank you for allowing me to participate in the care of your patient.      Sincerely,    An Rouse NP

## 2025-06-08 NOTE — PROGRESS NOTES
"UROLOGY OUTPATIENT CLINIC NOTE    Name: Heaven Marx    MRN: [id number]   YOB: 1990  Date of Encounter: 06/09/25              History of Present Illness:   Mr. Heaven Marx is a 34 year old female seen for follow-up and cystoscopic Botox injection. 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    Early childhood-intermittent stress urinary incontinence, urinary retention     4/9/2015 - L2 and L3 laminectomy, repair of diastematomyelia and tethered cord release      ER 3/17/25 for pain to CIC.  She feel that she is passing gas from her urethra.  CT has no abnormal findings.      3/27/25 - visit with AKLLIE Rouse, She voids spontaneously + CIC or postvoid CIC with 12 Irish at bedtime only.  If she does not CIC at bedtime, she could get leg spasms.  UTI x1/year. Difficulty with CIC sometimes.     4/9/25 - UDS  -Normal bladder capacity (442 mL).  -Early filling sensations though patient reported urethral discomfort after catheter placement.  -Good bladder compliance   -No DO/DOI. No reproducible SELAM with cough or Valsalva.   -Detrusor contraction during voiding to max Pdet = 40 cmH2O which patient supplements with significant Valsalva effort. Patient feels she must \"hold pressure\" in her bladder in order to maintain a urinary stream.   -Slow flow rate (Qmax 15 mL/s) with fluctuating, low-flow per pre-study uroflow  -Complete bladder emptying (final PVR 0 mL).  -Spikes of EMG activity concordant with Valsalva voiding.  -BOOI is -7.5 which suggests no bladder outlet obstruction.  -Fluoroscopy reveals a smooth to mildly-trabeculated bladder wall without diverticulae or cellules.  No VUR observed.  Bladder neck was closed during filling and and not observed during voiding as patient was unable to void volitionally on UDS chair.     4/10/25: f/u with KALLIE Rouse - tried another catheter brand for CIC, but still has occasional pain and " "difficulty  - Discussed that UDS showed possible DSD given high detrusor pressure and high EMG during voiding phase.  This could explain her difficulty urination and pain and difficulty with CIC.   - Started Flomax    6/9/25: here for ongoing discomfort/difficulty with CIC. This all started after one episode of difficulty catheterizing 2-3 months ago. She feels a \"catch\". Reviewed UDS tracings - does not appear consistent with DSD, rather there is EMG artifact during voiding due to significant Valsalva voiding. She took a few days of Flomax but felt some allergic symptoms coming on so stopped.    FLEXIBLE CYSTOSCOPY (June 9, 2025)  After informed consent was obtained, the patient was brought to the procedure room and placed in the supine position. The genitalia and perineum were prepped and draped in a sterile fashion. A 16F flexible cystoscope was introduced through a well lubricated urethra. Findings and interventions were as noted below.    Bladder: no lesions, erythema, stones  Urethra: the scope was withdrawn and the urethra was evaluated under good flow. In the distal urethra there is a flap of mucosa anteriorly. This does not appear pathologic however once I advance the scope back into the proximal urethra this reproduces the patient's pain/discomfort with cathing. I did this again to confirm. No other urethral pathology.     The scope was removed and I placed a 16F Giron catheter.         Past Medical History:     Past Medical History:   Diagnosis Date    Anxiety     Depression     Diastematomyelia (H)     Injury to nerves     In her foot, she dropped air conditioner on it    Neurogenic bladder     Obesity     Other depressive disorder     No Comments Provided    RLS (restless legs syndrome)     Tethered cord (H)           Past Surgical History:     Past Surgical History:   Procedure Laterality Date    GYN SURGERY  2014    hysterectomy BSO    RELEASE TETHERED CORD N/A 4/9/2015    Procedure: RELEASE TETHERED " CORD;  Surgeon: Nabil Awad MD;  Location: [facility] OR    RELEASE TETHERED CORD N/A 5/3/2019    Procedure: Spinal Cord Untethering, Adhesion Lysis;  Surgeon: Salomón Renner MD;  Location: [facility] OR          Social History:     Social History     Tobacco Use    Smoking status: Never    Smokeless tobacco: Never   Substance Use Topics    Alcohol use: No     Comment: rare          Family History:     Family History   Problem Relation Age of Onset    Hypertension Mother     No Known Problems Father     Aneurysm Maternal Grandmother         brain    Kidney Disease Maternal Grandfather     Diabetes Maternal Grandfather     Lung Cancer Paternal Grandmother     Liver Cancer Paternal Grandfather     Neurologic Disorder No family hx of           Allergies:     Allergies   Allergen Reactions    Gabapentin Hives    Sulfa Antibiotics Hives          Medications:     Current Outpatient Medications   Medication Sig Dispense Refill    acetaminophen (TYLENOL) 325 MG tablet Take 1-2 tablets (325-650 mg) by mouth every 4 hours as needed for mild pain 100 tablet 0    amphetamine-dextroamphetamine (ADDERALL) 15 MG tablet Take 15 mg by mouth 2 times daily. (Patient taking differently: Take 15 mg by mouth daily.)      buPROPion (WELLBUTRIN XL) 300 MG 24 hr tablet Take 150 mg by mouth At Bedtime       cyclobenzaprine (FLEXERIL) 5 MG tablet Take 1 tablet (5 mg) by mouth 3 times daily as needed for muscle spasms (Patient not taking: Reported on 3/27/2025) 25 tablet 0    DULoxetine (CYMBALTA) 60 MG capsule TAKE 1 CAPSULE DAILY AT BEDTIME DO NOT CRUSH      HYDROcodone-acetaminophen (NORCO) 5-325 MG tablet Take 1 tablet by mouth as needed for pain  (Patient not taking: Reported on 3/27/2025)      hydrOXYzine (ATARAX) 25 MG tablet Take 12.5-25 mg by mouth as needed  (Patient not taking: Reported on 3/27/2025)      lidocaine (LIDODERM) 5 % patch Place 1 patch onto the skin as needed  (Patient not taking: Reported on 3/27/2025)       "LORazepam (ATIVAN) 0.5 MG tablet Take 0.5 mg by mouth.      omeprazole (PRILOSEC) 40 MG DR capsule Take 40 mg by mouth daily.      oxyCODONE (ROXICODONE) 5 MG tablet Take 1 tablet (5 mg) by mouth every 6 hours as needed for severe pain (Patient not taking: Reported on 3/27/2025) 30 tablet 0    polyethylene glycol (MIRALAX/GLYCOLAX) packet Take 17 g by mouth 2 times daily as needed for constipation (Patient not taking: Reported on 3/27/2025) 10 packet 3    senna-docusate (SENOKOT-S/PERICOLACE) 8.6-50 MG tablet Take 2 tablets by mouth 2 times daily as needed for constipation (Patient not taking: Reported on 3/27/2025) 60 tablet 3    tamsulosin (FLOMAX) 0.4 MG capsule Take 1 capsule (0.4 mg) by mouth daily. 30 capsule 3    traZODone (DESYREL) 50 MG tablet Take  mg by mouth.      Vitamin D2, Ergocalciferol, 2000 units CAPS Take 2,000 Units by mouth At Bedtime ON HOLD FOR SURGERY SINCE 04/18/2019      Vitamin D3 50 mcg (2000 units) tablet Take 1 tablet by mouth daily.       Current Facility-Administered Medications   Medication Dose Route Frequency Provider Last Rate Last Admin    [START ON 6/9/2025] botulinum toxin type A (BOTOX) 100 units injection 100 Units  100 Units Intramuscular Once Brian Marie MD        [START ON 6/9/2025] botulinum toxin type A (BOTOX) 100 units injection 100 Units  100 Units Other Once An Rouse NP              Review of Systems:    ROS: 14-point review of systems was negative aside from that noted in the HPI. Additional pertinent positives are listed below.          Physical Exam:   /80 (BP Location: Right arm, Patient Position: Sitting, Cuff Size: Adult Large)   Pulse 100   Ht 1.6 m (5' 3\")   Wt 108.9 kg (240 lb)   SpO2 97%   BMI 42.51 kg/m    Estimated body mass index is 41.63 kg/m  as calculated from the following:    Height as of 3/27/25: 1.6 m (5' 3\").    Weight as of 3/27/25: 106.6 kg (235 lb).  General: Pleasant female in no apparent distress.  Resp: No " respiratory distress  CV: RRR  Abdomen: Soft, nontender, nondistended  : NEG       Labs:    All laboratory data reviewed with patient    Cr 0.85 (9/2024)    Creatinine   Date Value Ref Range Status   05/06/2019 0.60 0.52 - 1.04 mg/dL Final   05/05/2019 0.66 0.52 - 1.04 mg/dL Final   05/04/2019 0.60 0.52 - 1.04 mg/dL Final   05/02/2019 0.71 0.52 - 1.04 mg/dL Final   04/10/2015 0.73 0.52 - 1.04 mg/dL Final       Imaging:    All imaging reviewed with patient.    CTAP (3/2025):  1.  No acute abdominal abnormality. No cause of pain is evident.   2.  Multiple groundglass nodules at the lung bases are not significant changed and are of uncertain etiology. Follow-up chest CT suggested.   3.  Fatty infiltration of the liver.       Outside records:    I spent 10 minutes reviewing outside records.         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    Cystoscopy shows a ?healing urethral tear in the distal urethra. We deferred sphincter Botox as upon reviewing her UDS once again, the EMG activity during voiding appeared consistent with artifact from Valsalva voiding. Her history is also more consistent with a urethral injury/abrasion rather than pelvic floor overactivity.     16F Giron placed. Patient will remove this in 1 week and resume catheterizing 1-2x/day as is her routine.  Follow up in 6 weeks with KALLIE Rouse for symptom check  I don't believe sphincter Botox would be helpful for her but this an option that would be relatively low risk.    Brian Marie MD  Genitourinary Reconstructive Surgery Fellow    Additional Coding Information:    Problems:  4 -- two or more stable chronic illnesses    Data Reviewed  Review of external notes as documented above     Level of risk:  4 -- prescription drug management    Time spent:  30 minutes spent by me on the date of  the encounter doing chart review, history and exam, documentation and further activities per the note. This was outside of the planned cystoscopy procedure as I had to clarify the patients' history and symptoms to determine whether Botox injection would be indicated.

## 2025-06-09 ENCOUNTER — OFFICE VISIT (OUTPATIENT)
Dept: UROLOGY | Facility: CLINIC | Age: 35
End: 2025-06-09
Payer: COMMERCIAL

## 2025-06-09 VITALS
SYSTOLIC BLOOD PRESSURE: 122 MMHG | WEIGHT: 240 LBS | BODY MASS INDEX: 42.52 KG/M2 | DIASTOLIC BLOOD PRESSURE: 80 MMHG | HEIGHT: 63 IN | OXYGEN SATURATION: 97 % | HEART RATE: 100 BPM

## 2025-06-09 DIAGNOSIS — N31.9 NEUROGENIC BLADDER: Primary | ICD-10-CM

## 2025-06-09 RX ORDER — CIPROFLOXACIN 500 MG/1
500 TABLET, FILM COATED ORAL EVERY 12 HOURS SCHEDULED
Status: DISCONTINUED | OUTPATIENT
Start: 2025-06-09 | End: 2025-06-09

## 2025-06-09 RX ORDER — CIPROFLOXACIN 500 MG/1
500 TABLET, FILM COATED ORAL 2 TIMES DAILY
Status: ACTIVE | OUTPATIENT
Start: 2025-06-09 | End: 2025-06-10

## 2025-06-09 RX ORDER — CIPROFLOXACIN 500 MG/1
500 TABLET, FILM COATED ORAL 2 TIMES DAILY
Status: DISCONTINUED | OUTPATIENT
Start: 2025-06-09 | End: 2025-06-09

## 2025-06-09 RX ADMIN — CIPROFLOXACIN 500 MG: 500 TABLET, FILM COATED ORAL at 14:30

## 2025-06-09 ASSESSMENT — PAIN SCALES - GENERAL: PAINLEVEL_OUTOF10: NO PAIN (0)

## 2025-06-09 NOTE — NURSING NOTE
"Chief Complaint   Patient presents with    Cystoscopy     Pt states here for cystoscopy with Botox        Blood pressure 122/80, pulse 100, height 1.6 m (5' 3\"), weight 108.9 kg (240 lb), SpO2 97%, not currently breastfeeding. Body mass index is 42.51 kg/m .    Patient Active Problem List   Diagnosis    Tethered cord (H)    Diastematomyelia (H)    Adhesive arachnoiditis    ASCUS on Pap smear    BMI 38.0-38.9,adult    Generalized anxiety disorder    History of tethered spinal cord    Left-sided low back pain with left-sided sciatica    Major depression, recurrent    Major depressive disorder, recurrent episode, moderate (H)    Mononeuritis    Neurogenic bladder    Rash and other nonspecific skin eruption    Restless leg syndrome    Retention of urine    S/P laparoscopic hysterectomy    Temporomandibular joint disorder    Vitamin D deficiency    S/P spinal surgery       Allergies   Allergen Reactions    Gabapentin Hives    Sulfa Antibiotics Hives       Current Outpatient Medications   Medication Sig Dispense Refill    acetaminophen (TYLENOL) 325 MG tablet Take 1-2 tablets (325-650 mg) by mouth every 4 hours as needed for mild pain 100 tablet 0    amphetamine-dextroamphetamine (ADDERALL) 15 MG tablet Take 15 mg by mouth 2 times daily. (Patient taking differently: Take 15 mg by mouth daily.)      buPROPion (WELLBUTRIN XL) 300 MG 24 hr tablet Take 150 mg by mouth At Bedtime       DULoxetine (CYMBALTA) 60 MG capsule TAKE 1 CAPSULE DAILY AT BEDTIME DO NOT CRUSH      LORazepam (ATIVAN) 0.5 MG tablet Take 0.5 mg by mouth.      omeprazole (PRILOSEC) 40 MG DR capsule Take 40 mg by mouth daily.      tamsulosin (FLOMAX) 0.4 MG capsule Take 1 capsule (0.4 mg) by mouth daily. 30 capsule 3    traZODone (DESYREL) 50 MG tablet Take  mg by mouth.      Vitamin D2, Ergocalciferol, 2000 units CAPS Take 2,000 Units by mouth At Bedtime ON HOLD FOR SURGERY SINCE 04/18/2019      Vitamin D3 50 mcg (2000 units) tablet Take 1 tablet by " mouth daily.      cyclobenzaprine (FLEXERIL) 5 MG tablet Take 1 tablet (5 mg) by mouth 3 times daily as needed for muscle spasms (Patient not taking: Reported on 3/27/2025) 25 tablet 0    HYDROcodone-acetaminophen (NORCO) 5-325 MG tablet Take 1 tablet by mouth as needed for pain  (Patient not taking: Reported on 3/27/2025)      hydrOXYzine (ATARAX) 25 MG tablet Take 12.5-25 mg by mouth as needed  (Patient not taking: Reported on 3/27/2025)      lidocaine (LIDODERM) 5 % patch Place 1 patch onto the skin as needed  (Patient not taking: Reported on 3/27/2025)      oxyCODONE (ROXICODONE) 5 MG tablet Take 1 tablet (5 mg) by mouth every 6 hours as needed for severe pain (Patient not taking: Reported on 3/27/2025) 30 tablet 0    polyethylene glycol (MIRALAX/GLYCOLAX) packet Take 17 g by mouth 2 times daily as needed for constipation (Patient not taking: Reported on 3/27/2025) 10 packet 3    senna-docusate (SENOKOT-S/PERICOLACE) 8.6-50 MG tablet Take 2 tablets by mouth 2 times daily as needed for constipation (Patient not taking: Reported on 3/27/2025) 60 tablet 3       Social History     Tobacco Use    Smoking status: Never    Smokeless tobacco: Never   Substance Use Topics    Alcohol use: No     Comment: rare    Drug use: No     Types: Other     Comment: Drug use: No       What to expect after the procedure reviewed with patient: yes    Ji Fierro  2025  1:14 PM     Invasive Procedure Safety Checklist:    Procedure: cystoscopy    Action: Complete sections and checkboxes as appropriate.    Pre-procedure:  1. Patient ID Verified with 2 identifiers (Manisha and  or MRN) : YES    2. Procedure and site verified with patient/designee (when able) : YES    3. Accurate consent documentation in medical record : YES    4. H&P (or appropriate assessment) documented in medical record : YES  H&P must be up to 30 days prior to procedure an updated within 24 hours of                 Procedure as applicable.     5. Relevant  diagnostic and radiology test results appropriately labeled and displayed as applicable : YES    6. Blood products, implants, devices, and/or special equipment available for the procedure as applicable : YES    7. Procedure site(s) marked with provider initials [Exclusions: None] : NO    8. Marking not required. Reason : Yes  Procedure does not require site marking    Time Out:     Time-Out performed immediately prior to starting procedure, including verbal and active participation of all team members addressing: YES    1. Correct patient identity.  2. Confirmed that the correct side and site are marked.  3. An accurate procedure to be done.  4. Agreement on the procedure to be done.  5. Correct patient position.  6. Relevant images and results are properly labeled and appropriately displayed.  7. The need to administer antibiotics or fluids for irrigation purposes during the procedure as applicable.  8. Safety precautions based on patient history or medication use.    The following medication was given:     MEDICATION:  Ciprofloxacin  ROUTE: oral  SITE: mouth  DOSE: 500mg  LOT #: [id number]  : [employer].  EXPIRATION DATE: 2026/11  NDC#: 8501-3575-66   Was there drug waste? No  Multi-dose vial: No    Ji Fierro  June 9, 2025      Drug Amount Wasted:  None.  Vial/Syringe: Single dose vial    Ji Fierro  June 9, 2025

## 2025-06-09 NOTE — LETTER
"6/9/2025       RE: Heaven Marx  [address] [place] [address]     Dear Colleague,    Thank you for referring your patient, Heaven Marx, to the [facility] [place] at [facility]. Please see a copy of my visit note below.    UROLOGY OUTPATIENT CLINIC NOTE    Name: Heaven Marx    MRN: [id number]   YOB: 1990  Date of Encounter: 06/09/25              History of Present Illness:   Mr. Heaven Marx is a 34 year old female seen for follow-up and cystoscopic Botox injection. 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    Early childhood-intermittent stress urinary incontinence, urinary retention     4/9/2015 - L2 and L3 laminectomy, repair of diastematomyelia and tethered cord release      ER 3/17/25 for pain to CIC.  She feel that she is passing gas from her urethra.  CT has no abnormal findings.      3/27/25 - visit with KALLIE Rouse, She voids spontaneously + CIC or postvoid CIC with 12 Thai at bedtime only.  If she does not CIC at bedtime, she could get leg spasms.  UTI x1/year. Difficulty with CIC sometimes.     4/9/25 - UDS  -Normal bladder capacity (442 mL).  -Early filling sensations though patient reported urethral discomfort after catheter placement.  -Good bladder compliance   -No DO/DOI. No reproducible SELAM with cough or Valsalva.   -Detrusor contraction during voiding to max Pdet = 40 cmH2O which patient supplements with significant Valsalva effort. Patient feels she must \"hold pressure\" in her bladder in order to maintain a urinary stream.   -Slow flow rate (Qmax 15 mL/s) with fluctuating, low-flow per pre-study uroflow  -Complete bladder emptying (final PVR 0 mL).  -Spikes of EMG activity concordant with Valsalva voiding.  -BOOI is -7.5 which suggests no bladder outlet obstruction.  -Fluoroscopy reveals a smooth to mildly-trabeculated " "bladder wall without diverticulae or cellules.  No VUR observed.  Bladder neck was closed during filling and and not observed during voiding as patient was unable to void volitionally on UDS chair.     4/10/25: f/u with KALLIE Rouse - tried another catheter brand for CIC, but still has occasional pain and difficulty  - Discussed that UDS showed possible DSD given high detrusor pressure and high EMG during voiding phase.  This could explain her difficulty urination and pain and difficulty with CIC.   - Started Flomax    6/9/25: here for ongoing discomfort/difficulty with CIC. This all started after one episode of difficulty catheterizing 2-3 months ago. She feels a \"catch\". Reviewed UDS tracings - does not appear consistent with DSD, rather there is EMG artifact during voiding due to significant Valsalva voiding. She took a few days of Flomax but felt some allergic symptoms coming on so stopped.    FLEXIBLE CYSTOSCOPY (June 9, 2025)  After informed consent was obtained, the patient was brought to the procedure room and placed in the supine position. The genitalia and perineum were prepped and draped in a sterile fashion. A 16F flexible cystoscope was introduced through a well lubricated urethra. Findings and interventions were as noted below.    Bladder: no lesions, erythema, stones  Urethra: the scope was withdrawn and the urethra was evaluated under good flow. In the distal urethra there is a flap of mucosa anteriorly. This does not appear pathologic however once I advance the scope back into the proximal urethra this reproduces the patient's pain/discomfort with cathing. I did this again to confirm. No other urethral pathology.     The scope was removed and I placed a 16F Giron catheter.         Past Medical History:     Past Medical History:   Diagnosis Date     Anxiety      Depression      Diastematomyelia (H)      Injury to nerves     In her foot, she dropped air conditioner on it     Neurogenic bladder      Obesity "      Other depressive disorder     No Comments Provided     RLS (restless legs syndrome)      Tethered cord (H)           Past Surgical History:     Past Surgical History:   Procedure Laterality Date     GYN SURGERY  2014    hysterectomy BSO     RELEASE TETHERED CORD N/A 4/9/2015    Procedure: RELEASE TETHERED CORD;  Surgeon: Nabil Awad MD;  Location: [facility] OR     RELEASE TETHERED CORD N/A 5/3/2019    Procedure: Spinal Cord Untethering, Adhesion Lysis;  Surgeon: Salomón Renner MD;  Location: [facility] OR          Social History:     Social History     Tobacco Use     Smoking status: Never     Smokeless tobacco: Never   Substance Use Topics     Alcohol use: No     Comment: rare          Family History:     Family History   Problem Relation Age of Onset     Hypertension Mother      No Known Problems Father      Aneurysm Maternal Grandmother         brain     Kidney Disease Maternal Grandfather      Diabetes Maternal Grandfather      Lung Cancer Paternal Grandmother      Liver Cancer Paternal Grandfather      Neurologic Disorder No family hx of           Allergies:     Allergies   Allergen Reactions     Gabapentin Hives     Sulfa Antibiotics Hives          Medications:     Current Outpatient Medications   Medication Sig Dispense Refill     acetaminophen (TYLENOL) 325 MG tablet Take 1-2 tablets (325-650 mg) by mouth every 4 hours as needed for mild pain 100 tablet 0     amphetamine-dextroamphetamine (ADDERALL) 15 MG tablet Take 15 mg by mouth 2 times daily. (Patient taking differently: Take 15 mg by mouth daily.)       buPROPion (WELLBUTRIN XL) 300 MG 24 hr tablet Take 150 mg by mouth At Bedtime        cyclobenzaprine (FLEXERIL) 5 MG tablet Take 1 tablet (5 mg) by mouth 3 times daily as needed for muscle spasms (Patient not taking: Reported on 3/27/2025) 25 tablet 0     DULoxetine (CYMBALTA) 60 MG capsule TAKE 1 CAPSULE DAILY AT BEDTIME DO NOT CRUSH       HYDROcodone-acetaminophen (NORCO) 5-325 MG tablet Take  1 tablet by mouth as needed for pain  (Patient not taking: Reported on 3/27/2025)       hydrOXYzine (ATARAX) 25 MG tablet Take 12.5-25 mg by mouth as needed  (Patient not taking: Reported on 3/27/2025)       lidocaine (LIDODERM) 5 % patch Place 1 patch onto the skin as needed  (Patient not taking: Reported on 3/27/2025)       LORazepam (ATIVAN) 0.5 MG tablet Take 0.5 mg by mouth.       omeprazole (PRILOSEC) 40 MG DR capsule Take 40 mg by mouth daily.       oxyCODONE (ROXICODONE) 5 MG tablet Take 1 tablet (5 mg) by mouth every 6 hours as needed for severe pain (Patient not taking: Reported on 3/27/2025) 30 tablet 0     polyethylene glycol (MIRALAX/GLYCOLAX) packet Take 17 g by mouth 2 times daily as needed for constipation (Patient not taking: Reported on 3/27/2025) 10 packet 3     senna-docusate (SENOKOT-S/PERICOLACE) 8.6-50 MG tablet Take 2 tablets by mouth 2 times daily as needed for constipation (Patient not taking: Reported on 3/27/2025) 60 tablet 3     tamsulosin (FLOMAX) 0.4 MG capsule Take 1 capsule (0.4 mg) by mouth daily. 30 capsule 3     traZODone (DESYREL) 50 MG tablet Take  mg by mouth.       Vitamin D2, Ergocalciferol, 2000 units CAPS Take 2,000 Units by mouth At Bedtime ON HOLD FOR SURGERY SINCE 04/18/2019       Vitamin D3 50 mcg (2000 units) tablet Take 1 tablet by mouth daily.       Current Facility-Administered Medications   Medication Dose Route Frequency Provider Last Rate Last Admin     [START ON 6/9/2025] botulinum toxin type A (BOTOX) 100 units injection 100 Units  100 Units Intramuscular Once Brian Marie MD         [START ON 6/9/2025] botulinum toxin type A (BOTOX) 100 units injection 100 Units  100 Units Other Once An Rouse NP              Review of Systems:    ROS: 14-point review of systems was negative aside from that noted in the HPI. Additional pertinent positives are listed below.          Physical Exam:   /80 (BP Location: Right arm, Patient Position:  "Sitting, Cuff Size: Adult Large)   Pulse 100   Ht 1.6 m (5' 3\")   Wt 108.9 kg (240 lb)   SpO2 97%   BMI 42.51 kg/m    Estimated body mass index is 41.63 kg/m  as calculated from the following:    Height as of 3/27/25: 1.6 m (5' 3\").    Weight as of 3/27/25: 106.6 kg (235 lb).  General: Pleasant female in no apparent distress.  Resp: No respiratory distress  CV: RRR  Abdomen: Soft, nontender, nondistended  : NEG       Labs:    All laboratory data reviewed with patient    Cr 0.85 (9/2024)    Creatinine   Date Value Ref Range Status   05/06/2019 0.60 0.52 - 1.04 mg/dL Final   05/05/2019 0.66 0.52 - 1.04 mg/dL Final   05/04/2019 0.60 0.52 - 1.04 mg/dL Final   05/02/2019 0.71 0.52 - 1.04 mg/dL Final   04/10/2015 0.73 0.52 - 1.04 mg/dL Final       Imaging:    All imaging reviewed with patient.    CTAP (3/2025):  1.  No acute abdominal abnormality. No cause of pain is evident.   2.  Multiple groundglass nodules at the lung bases are not significant changed and are of uncertain etiology. Follow-up chest CT suggested.   3.  Fatty infiltration of the liver.       Outside records:    I spent 10 minutes reviewing outside records.         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    Cystoscopy shows a ?healing urethral tear in the distal urethra. We deferred sphincter Botox as upon reviewing her UDS once again, the EMG activity during voiding appeared consistent with artifact from Valsalva voiding. Her history is also more consistent with a urethral injury/abrasion rather than pelvic floor overactivity.     16F Giron placed. Patient will remove this in 1 week and resume catheterizing 1-2x/day as is her routine.  Follow up in 6 weeks with KALLIE Rouse for symptom check  I don't believe sphincter Botox would be helpful for her but this an option that would be " relatively low risk.    Brian Marie MD  Genitourinary Reconstructive Surgery Fellow    Additional Coding Information:    Problems:  4 -- two or more stable chronic illnesses    Data Reviewed  Review of external notes as documented above     Level of risk:  4 -- prescription drug management    Time spent:  30 minutes spent by me on the date of the encounter doing chart review, history and exam, documentation and further activities per the note. This was outside of the planned cystoscopy procedure as I had to clarify the patients' history and symptoms to determine whether Botox injection would be indicated.    Again, thank you for allowing me to participate in the care of your patient.      Sincerely,    Brian Marie MD

## 2025-07-18 ENCOUNTER — MEDICAL CORRESPONDENCE (OUTPATIENT)
Dept: HEALTH INFORMATION MANAGEMENT | Facility: CLINIC | Age: 35
End: 2025-07-18
Payer: COMMERCIAL

## 2025-07-23 ENCOUNTER — TRANSCRIBE ORDERS (OUTPATIENT)
Dept: OTHER | Age: 35
End: 2025-07-23

## 2025-07-23 DIAGNOSIS — M54.59 LUMBAR FACET JOINT PAIN: ICD-10-CM

## 2025-07-23 DIAGNOSIS — M51.34 DDD (DEGENERATIVE DISC DISEASE), THORACIC: Primary | ICD-10-CM

## 2025-07-24 ENCOUNTER — PATIENT OUTREACH (OUTPATIENT)
Dept: CARE COORDINATION | Facility: CLINIC | Age: 35
End: 2025-07-24
Payer: COMMERCIAL

## 2025-08-25 ENCOUNTER — DOCUMENTATION ONLY (OUTPATIENT)
Dept: NEUROPSYCHOLOGY | Facility: CLINIC | Age: 35
End: 2025-08-25
Payer: COMMERCIAL

## 2025-08-27 ENCOUNTER — VIRTUAL VISIT (OUTPATIENT)
Dept: NEUROPSYCHOLOGY | Facility: CLINIC | Age: 35
End: 2025-08-27
Attending: NEUROLOGICAL SURGERY
Payer: COMMERCIAL

## 2025-08-27 DIAGNOSIS — Q06.8 TETHERED CORD (H): ICD-10-CM

## 2025-08-27 DIAGNOSIS — F54 PSYCHOLOGICAL FACTORS AFFECTING MEDICAL CONDITION: Primary | ICD-10-CM

## 2025-08-27 DIAGNOSIS — M96.1 FAILED BACK SURGICAL SYNDROME: ICD-10-CM

## 2025-08-27 PROCEDURE — 96130 PSYCL TST EVAL PHYS/QHP 1ST: CPT | Mod: 95

## 2025-08-27 PROCEDURE — 90791 PSYCH DIAGNOSTIC EVALUATION: CPT | Mod: 95

## 2025-08-27 PROCEDURE — 96139 PSYCL/NRPSYC TST TECH EA: CPT | Mod: 95

## 2025-08-27 PROCEDURE — 96138 PSYCL/NRPSYC TECH 1ST: CPT | Mod: 95

## (undated) DEVICE — PACK NEURO MINOR UMMC SNE32MNMU4

## (undated) DEVICE — SUCTION MANIFOLD DORNOCH ULTRA CART UL-CL500

## (undated) DEVICE — PREP CHLORAPREP CLEAR 3ML 260400

## (undated) DEVICE — BUR MATCHSTICK 3MM ANSPACH S-8NS-G1

## (undated) DEVICE — DRAPE MICROSCOPE LEICA 54X150" AR8033650

## (undated) DEVICE — NDL BLUNT 17GA 1.5" 8881202330

## (undated) DEVICE — LINEN TOWEL PACK X6 WHITE 5487

## (undated) DEVICE — BLADE KNIFE SURG 15 371115

## (undated) DEVICE — SPONGE COTTONOID 1/2X1/2" 20-04S

## (undated) DEVICE — DRAPE MAYO STAND 23X54 8337

## (undated) DEVICE — SURGICEL HEMOSTAT 4X8" 1952

## (undated) DEVICE — CATH TRAY FOLEY SURESTEP 16FR W/TMP PRB STLK LATEX A319416AM

## (undated) DEVICE — SU PROLENE 6-0 RB-2DA 18" 8714H

## (undated) DEVICE — SPONGE COTTONOID 1X3" 20-10S

## (undated) DEVICE — SU NUROLON 4-0 TF CR 8X18" C584D

## (undated) DEVICE — SPONGE SURGIFOAM 100 1974

## (undated) DEVICE — NDL ANGIOCATH 14GA 1.25" 4048

## (undated) DEVICE — DRAPE STERI TOWEL LG 1010

## (undated) DEVICE — SOL NACL 0.9% IRRIG 1000ML BOTTLE 07138-09

## (undated) DEVICE — PREP CHLORAPREP 26ML TINTED ORANGE  260815

## (undated) DEVICE — GLOVE PROTEXIS MICRO 8.0  2D73PM80

## (undated) DEVICE — SU VICRYL 2-0 CT-2 CR 8X18" J726D

## (undated) DEVICE — SU ETHILON 3-0 PS-1 18" 1663H

## (undated) DEVICE — SU VICRYL 4-0 TF CR 8X18" J743D

## (undated) DEVICE — GLOVE PROTEXIS BLUE W/NEU-THERA 8.5  2D73EB85

## (undated) DEVICE — NDL BLUNT 18GA 1" W/O FILTER 305181

## (undated) DEVICE — DRAPE SHEET REV FOLD 3/4 9349

## (undated) DEVICE — DRSG STERI STRIP 1/2X4" R1547

## (undated) DEVICE — DRAPE C-ARM W/STRAPS 42X72" 07-CA104

## (undated) DEVICE — SPONGE COTTONOID 1/4X1/4" 20-01S

## (undated) DEVICE — SU VICRYL 3-0 SH 8X18" UND J864D

## (undated) DEVICE — IOM MONITORING ---- 15 MIN

## (undated) DEVICE — IOM SUPPLIES

## (undated) DEVICE — ADH FLOSEAL W/HUMAN THROMBIN 5ML W/APPLICATOR TIP ADS201844

## (undated) DEVICE — VESSEL LOOPS RED MINI 31145710

## (undated) DEVICE — SU PROLENE 6-0 BV-1DA 18" 8709H

## (undated) DEVICE — ESU GROUND PAD ADULT W/CORD E7507

## (undated) DEVICE — SYR 30ML LL W/O NDL 302832

## (undated) DEVICE — SOL WATER IRRIG 1000ML BOTTLE 2F7114

## (undated) DEVICE — LINEN TOWEL PACK X30 5481

## (undated) DEVICE — ESU CORD BIPOLAR AND IRR TUBING AESCULAP US355

## (undated) DEVICE — SPONGE SURGIFOAM 12 1972

## (undated) DEVICE — ESU ELEC BLADE 2.75" COATED/INSULATED E1455

## (undated) DEVICE — DRSG PRIMAPORE 03 1/8X6" 66000318

## (undated) DEVICE — SPONGE COTTONOID 1/2X1 1/2" 20-06S

## (undated) DEVICE — SU VICRYL 0 CT-1 CR 8X18" J740D

## (undated) RX ORDER — PROPOFOL 10 MG/ML
INJECTION, EMULSION INTRAVENOUS
Status: DISPENSED
Start: 2019-05-03

## (undated) RX ORDER — SUFENTANIL CITRATE 50 UG/ML
INJECTION EPIDURAL; INTRAVENOUS
Status: DISPENSED
Start: 2019-05-03

## (undated) RX ORDER — HYDROMORPHONE HYDROCHLORIDE 1 MG/ML
INJECTION, SOLUTION INTRAMUSCULAR; INTRAVENOUS; SUBCUTANEOUS
Status: DISPENSED
Start: 2019-05-03

## (undated) RX ORDER — CEPHALEXIN 500 MG/1
CAPSULE ORAL
Status: DISPENSED
Start: 2025-04-09

## (undated) RX ORDER — CIPROFLOXACIN 500 MG/1
TABLET, FILM COATED ORAL
Status: DISPENSED
Start: 2025-06-09

## (undated) RX ORDER — ACETAMINOPHEN 325 MG/1
TABLET ORAL
Status: DISPENSED
Start: 2019-05-03

## (undated) RX ORDER — KETAMINE HCL IN 0.9 % NACL 50 MG/5 ML
SYRINGE (ML) INTRAVENOUS
Status: DISPENSED
Start: 2019-05-03

## (undated) RX ORDER — PHENYLEPHRINE HCL IN 0.9% NACL 1 MG/10 ML
SYRINGE (ML) INTRAVENOUS
Status: DISPENSED
Start: 2019-05-03

## (undated) RX ORDER — BUPIVACAINE HYDROCHLORIDE AND EPINEPHRINE 5; 5 MG/ML; UG/ML
INJECTION, SOLUTION EPIDURAL; INTRACAUDAL; PERINEURAL
Status: DISPENSED
Start: 2019-05-03

## (undated) RX ORDER — BACITRACIN 50000 [IU]/1
INJECTION, POWDER, FOR SOLUTION INTRAMUSCULAR
Status: DISPENSED
Start: 2019-05-03

## (undated) RX ORDER — DEXAMETHASONE SODIUM PHOSPHATE 4 MG/ML
INJECTION, SOLUTION INTRA-ARTICULAR; INTRALESIONAL; INTRAMUSCULAR; INTRAVENOUS; SOFT TISSUE
Status: DISPENSED
Start: 2019-05-03

## (undated) RX ORDER — LIDOCAINE HYDROCHLORIDE 20 MG/ML
INJECTION, SOLUTION EPIDURAL; INFILTRATION; INTRACAUDAL; PERINEURAL
Status: DISPENSED
Start: 2019-05-03

## (undated) RX ORDER — FENTANYL CITRATE 50 UG/ML
INJECTION, SOLUTION INTRAMUSCULAR; INTRAVENOUS
Status: DISPENSED
Start: 2019-05-03

## (undated) RX ORDER — SODIUM CHLORIDE 9 MG/ML
INJECTION, SOLUTION INTRAVENOUS
Status: DISPENSED
Start: 2019-05-03